# Patient Record
Sex: FEMALE | Race: OTHER | Employment: FULL TIME | URBAN - NONMETROPOLITAN AREA
[De-identification: names, ages, dates, MRNs, and addresses within clinical notes are randomized per-mention and may not be internally consistent; named-entity substitution may affect disease eponyms.]

---
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
## 2021-07-15 ENCOUNTER — HOSPITAL ENCOUNTER (EMERGENCY)
Age: 21
Discharge: HOME | End: 2021-07-15
Attending: EMERGENCY MEDICINE
Payer: COMMERCIAL

## 2021-07-15 ENCOUNTER — APPOINTMENT (OUTPATIENT)
Dept: GENERAL RADIOLOGY | Age: 21
End: 2021-07-15
Payer: COMMERCIAL

## 2021-07-15 VITALS
TEMPERATURE: 97.5 F | WEIGHT: 195 LBS | DIASTOLIC BLOOD PRESSURE: 73 MMHG | BODY MASS INDEX: 31.47 KG/M2 | RESPIRATION RATE: 14 BRPM | OXYGEN SATURATION: 98 % | SYSTOLIC BLOOD PRESSURE: 119 MMHG | HEART RATE: 104 BPM

## 2021-07-15 DIAGNOSIS — S93.402A MODERATE LEFT ANKLE SPRAIN, INITIAL ENCOUNTER: Primary | ICD-10-CM

## 2021-07-15 PROCEDURE — 99214 OFFICE O/P EST MOD 30 MIN: CPT | Performed by: EMERGENCY MEDICINE

## 2021-07-15 PROCEDURE — 73610 X-RAY EXAM OF ANKLE: CPT

## 2021-07-15 PROCEDURE — 99213 OFFICE O/P EST LOW 20 MIN: CPT

## 2021-07-15 RX ORDER — NAPROXEN 500 MG/1
500 TABLET ORAL 2 TIMES DAILY WITH MEALS
Qty: 20 TABLET | Refills: 0 | Status: SHIPPED | OUTPATIENT
Start: 2021-07-15 | End: 2021-08-06

## 2021-07-15 ASSESSMENT — ENCOUNTER SYMPTOMS
DIARRHEA: 0
EYE PAIN: 0
COUGH: 0
FACIAL SWELLING: 0
SORE THROAT: 0
NAUSEA: 0
TROUBLE SWALLOWING: 0
ABDOMINAL PAIN: 0
VOICE CHANGE: 0
EYE REDNESS: 0
STRIDOR: 0
BLOOD IN STOOL: 0
WHEEZING: 0
CONSTIPATION: 0
CHOKING: 0
VOMITING: 0
EYE DISCHARGE: 0
BACK PAIN: 0
SINUS PRESSURE: 0
SHORTNESS OF BREATH: 0

## 2021-07-15 ASSESSMENT — PAIN DESCRIPTION - FREQUENCY: FREQUENCY: INTERMITTENT

## 2021-07-15 ASSESSMENT — PAIN DESCRIPTION - DESCRIPTORS: DESCRIPTORS: DISCOMFORT;PRESSURE;SHARP

## 2021-07-15 ASSESSMENT — PAIN DESCRIPTION - ORIENTATION: ORIENTATION: LEFT

## 2021-07-15 ASSESSMENT — PAIN DESCRIPTION - LOCATION: LOCATION: ANKLE

## 2021-07-15 ASSESSMENT — PAIN - FUNCTIONAL ASSESSMENT: PAIN_FUNCTIONAL_ASSESSMENT: PREVENTS OR INTERFERES WITH MANY ACTIVE NOT PASSIVE ACTIVITIES

## 2021-07-15 ASSESSMENT — PAIN SCALES - GENERAL: PAINLEVEL_OUTOF10: 10

## 2021-07-15 ASSESSMENT — PAIN DESCRIPTION - PAIN TYPE: TYPE: ACUTE PAIN

## 2021-07-15 NOTE — ED NOTES
Air cast applied to pt's left ankle. Medium crutches was given to pt. Pt instructed on how to walk with crutches. Pt demonstrated on how to walk with crutches.       Keisha Cummings LPN  39/57/74 [id number]

## 2021-07-15 NOTE — ED TRIAGE NOTES
Patient limped to rm. 4, states left ankle injury today at home, 0700. Mis-stepped the last stair step, twisting ankle. Denies falling.

## 2021-07-15 NOTE — ED NOTES
Pt discharged. Pt verbalized understanding of discharge instructions and script. Pt walked out with crutches. Pt in stable condition.      Korey Mattson LPN  51/49/12 [id number]

## 2021-07-15 NOTE — ED PROVIDER NOTES
[phone]       Chief Complaint   Patient presents with    Ankle Pain     left ankle injury       Nurses Notes reviewed and I agree except as noted in the HPI. HISTORY OF PRESENT ILLNESS   Ganga Amaya is a 24 y.o. female who presents 1 hour after she sustained twisting injury to her left ankle in her home in [address] [place] [address]. She is unable to bear weight. She had immediate lateral swelling of the left ankle. She rates pain at 10 out of 10 severity. No deformity, laceration, motor or sensory deficit. No head neck or back injury. No previous fracture left lower extremity. Non-smoker    REVIEW OF SYSTEMS     Review of Systems   Constitutional: Negative for appetite change, chills, fatigue, fever and unexpected weight change. HENT: Negative for congestion, ear discharge, ear pain, facial swelling, hearing loss, nosebleeds, postnasal drip, sinus pressure, sore throat, trouble swallowing and voice change. Eyes: Negative for pain, discharge, redness and visual disturbance. Respiratory: Negative for cough, choking, shortness of breath, wheezing and stridor. Cardiovascular: Negative for chest pain and leg swelling. Gastrointestinal: Negative for abdominal pain, blood in stool, constipation, diarrhea, nausea and vomiting. Genitourinary: Negative for dysuria, flank pain, frequency, hematuria, urgency, vaginal bleeding and vaginal discharge. Musculoskeletal: Negative for arthralgias, back pain, neck pain and neck stiffness. Left ankle pain and swelling after twisting injury   Skin: Negative for rash. Neurological: Negative for dizziness, seizures, syncope, weakness, light-headedness and headaches. Hematological: Negative for adenopathy. Does not bruise/bleed easily. Psychiatric/Behavioral: Negative for confusion, sleep disturbance and suicidal ideas. The patient is not nervous/anxious.     All other systems reviewed and are negative. PAST MEDICAL HISTORY         Diagnosis Date    Depression    No previous fracture left lower extremity. No closed head injury. SURGICAL HISTORY     Patient  has a past surgical history that includes cyst removal and [place] tooth extraction. CURRENT MEDICATIONS       Discharge Medication List as of 7/15/2021  9:15 AM          ALLERGIES     Patient is has No Known Allergies. FAMILY HISTORY     Patient'sfamily history includes Cancer in her mother; Depression in her mother; No Known Problems in her father; Substance Abuse in her mother. SOCIAL HISTORY     Patient  reports that she has never smoked. She has never used smokeless tobacco. She reports current alcohol use. She reports current drug use. Drug: Marijuana. PHYSICAL EXAM     ED TRIAGE VITALS  BP: 119/73, Temp: 97.5 °F (36.4 °C), Pulse: 104, Resp: 14, SpO2: 98 %  Physical Exam  Vitals and nursing note reviewed. Constitutional:       General: She is not in acute distress. Appearance: She is well-developed. She is not ill-appearing. Comments: Moist membranes, normal airway   HENT:      Head: Normocephalic and atraumatic. Right Ear: Tympanic membrane and external ear normal.      Left Ear: Tympanic membrane and external ear normal.      Nose: Nose normal.      Mouth/Throat:      Pharynx: No oropharyngeal exudate. Comments: Oropharynx normal  Eyes:      General: No scleral icterus. Right eye: No discharge. Left eye: No discharge. Extraocular Movements:      Right eye: Normal extraocular motion. Left eye: Normal extraocular motion. Conjunctiva/sclera: Conjunctivae normal.      Pupils: Pupils are equal, round, and reactive to light. Comments: Conjunctiva clear, orbits atraumatic   Neck:      Thyroid: No thyromegaly. Vascular: No JVD. Cardiovascular:      Rate and Rhythm: Normal rate and regular rhythm. Pulses: Normal pulses.       Heart sounds: Normal heart sounds, S1 normal and S2 normal. No murmur heard. No friction rub. No gallop. Pulmonary:      Effort: Pulmonary effort is normal. No tachypnea or respiratory distress. Breath sounds: Normal breath sounds. No stridor. No decreased breath sounds, wheezing, rhonchi or rales. Comments: No Cough, chest atraumatic, lungs clear  Chest:      Chest wall: No tenderness. Abdominal:      General: Bowel sounds are normal. There is no distension. Palpations: Abdomen is soft. There is no mass. Tenderness: There is no abdominal tenderness. There is no right CVA tenderness, left CVA tenderness, guarding or rebound. Comments: Soft nontender   Musculoskeletal:         General: Normal range of motion. Cervical back: Normal range of motion. No spinous process tenderness or muscular tenderness. Right ankle: Normal.      Left ankle: Swelling present. Tenderness present over the lateral malleolus, AITF ligament, CF ligament and posterior TF ligament. No base of 5th metatarsal or proximal fibula tenderness. Anterior drawer test negative. Normal pulse. Lymphadenopathy:      Cervical: No cervical adenopathy. Right cervical: No superficial cervical adenopathy. Left cervical: No superficial cervical adenopathy. Skin:     General: Skin is warm and dry. Findings: No erythema or rash. Comments: No laceration or bruising   Neurological:      Mental Status: She is alert and oriented to person, place, and time. Cranial Nerves: No cranial nerve deficit. Motor: No abnormal muscle tone. Coordination: Coordination normal.      Deep Tendon Reflexes: Reflexes are normal and symmetric. Reflexes normal.      Comments: Appropriate, no focal findings, distal neurovascular intact left lower extremity   Psychiatric:         Behavior: Behavior normal.         Thought Content:  Thought content normal.         Judgment: Judgment normal.         DIAGNOSTIC RESULTS   Labs: No results found for this visit on 07/15/21. IMAGING:  XR ANKLE LEFT (MIN 3 VIEWS)   Final Result         1. No acute fracture noted. 2. Soft tissue swelling over the lateral malleolus. .               **This report has been created using voice recognition software. It may contain minor errors which are inherent in voice recognition technology. **      Final report electronically signed by DR Melchor Doyle on 7/15/2021 9:01 AM        URGENT CARE COURSE:     Vitals:    07/15/21 0829   BP: 119/73   Pulse: 104   Resp: 14   Temp: 97.5 °F (36.4 °C)   TempSrc: Temporal   SpO2: 98%   Weight: 195 lb (88.5 kg)       Medications - No data to display  PROCEDURES:  None  FINALIMPRESSION      1. Moderate left ankle sprain, initial encounter        DISPOSITION/PLAN   DISPOSITION Decision To Discharge 07/15/2021 09:12:45 AM  Nontoxic, well-hydrated, normal airway. No radiographic evidence of fracture, dislocation, foreign body, arthritis. No neurovascular complication. No infection. .  Patient has moderate left ankle sprain. Will treat with RICE treatment, crutches, avoidance of weightbearing, Naprosyn, Tylenol. Patient to follow-up with family medicine practice for recheck in 4 days, and understands to go to ED if worse. No weightbearing until recheck. PATIENT REFERRED TO:  [facility] [address],[address]  [facility] [address] [place] [address]  Schedule an appointment as soon as possible for a visit in 4 days  Recheck in office, go to emergency if worse    DISCHARGE MEDICATIONS:  Discharge Medication List as of 7/15/2021  9:15 AM      START taking these medications    Details   naproxen (NAPROSYN) 500 MG tablet Take 1 tablet by mouth 2 times daily (with meals) for 20 doses, Disp-20 tablet, R-0Print           Discharge Medication List as of 7/15/2021  9:15 AM          MD Melina Kumari MD  07/15/21 [address] [place] [address] Road, MD  07/15/21 0437

## 2021-08-06 ENCOUNTER — HOSPITAL ENCOUNTER (EMERGENCY)
Age: 21
Discharge: HOME | End: 2021-08-06
Payer: COMMERCIAL

## 2021-08-06 VITALS
DIASTOLIC BLOOD PRESSURE: 75 MMHG | SYSTOLIC BLOOD PRESSURE: 123 MMHG | BODY MASS INDEX: 31.34 KG/M2 | HEIGHT: 66 IN | RESPIRATION RATE: 16 BRPM | OXYGEN SATURATION: 100 % | HEART RATE: 72 BPM | TEMPERATURE: 98.7 F | WEIGHT: 195 LBS

## 2021-08-06 DIAGNOSIS — Z48.02 VISIT FOR SUTURE REMOVAL: Primary | ICD-10-CM

## 2021-08-06 PROCEDURE — 99211 OFF/OP EST MAY X REQ PHY/QHP: CPT

## 2021-08-06 PROCEDURE — 99024 POSTOP FOLLOW-UP VISIT: CPT | Performed by: NURSE PRACTITIONER

## 2021-08-06 ASSESSMENT — ENCOUNTER SYMPTOMS
COUGH: 0
CHEST TIGHTNESS: 0
COLOR CHANGE: 0
CHOKING: 0
BACK PAIN: 0
APNEA: 0
SHORTNESS OF BREATH: 0
STRIDOR: 0
WHEEZING: 0

## 2021-08-06 NOTE — ED PROVIDER NOTES
[facility]      CHIEF COMPLAINT       Chief Complaint   Patient presents with    Suture / Staple Removal     7 days ago       Nurses Notes reviewed and I agree except as noted in the HPI. HISTORY OFPRESENT ILLNESS   Jon Sauceda is a 24 y.o. The history is provided by the patient. No  was used. Wound Check   She was treated in the ED 5 to 10 days ago. Previous treatment in the ED includes wound cleansing or irrigation. Treatments since wound repair include regular soap and water washings. Fever duration: none. There has been no drainage from the wound. There is no redness present. There is no swelling present. There is no pain present. She has no difficulty moving the affected extremity or digit. REVIEW OF SYSTEMS     Review of Systems   Constitutional: Negative for activity change, appetite change, chills, diaphoresis, fatigue, fever and unexpected weight change. Respiratory: Negative for apnea, cough, choking, chest tightness, shortness of breath, wheezing and stridor. Musculoskeletal: Negative for arthralgias, back pain, gait problem, joint swelling, myalgias, neck pain and neck stiffness. Skin: Positive for wound. Negative for color change, pallor and rash. PAST MEDICAL HISTORY         Diagnosis Date    Depression        SURGICAL HISTORY     Patient  has a past surgical history that includes cyst removal and [place] tooth extraction. CURRENT MEDICATIONS       Discharge Medication List as of 8/6/2021  2:06 PM          ALLERGIES     Patient is has No Known Allergies. FAMILY HISTORY     Patient's family history includes Cancer in her mother; Depression in her mother; No Known Problems in her father; Substance Abuse in her mother. SOCIAL HISTORY     Patient  reports that she has never smoked. She has never used smokeless tobacco. She reports current alcohol use. She reports current drug use.  Drug: Marijuana. PHYSICAL EXAM     ED TRIAGE VITALS  BP: 123/75, Temp: 98.7 °F (37.1 °C), Pulse: 72, Resp: 16, SpO2: 100 %  Physical Exam  Vitals and nursing note reviewed. Constitutional:       General: She is not in acute distress. Appearance: Normal appearance. She is obese. She is not ill-appearing, toxic-appearing or diaphoretic. HENT:      Head: Normocephalic and atraumatic. Right Ear: External ear normal.      Left Ear: External ear normal.   Eyes:      Extraocular Movements: Extraocular movements intact. Conjunctiva/sclera: Conjunctivae normal.   Skin:     Coloration: Skin is not ashen, cyanotic, jaundiced, mottled, pale or sallow. Comments: 5 stitches right temporal region. No redness, oozing, or draining noted. Sutures removed by Akil Nicole. Neurological:      Mental Status: She is alert. DIAGNOSTIC RESULTS   Labs:No results found for this visit on 08/06/21. IMAGING:  No orders to display     URGENT CARE COURSE:     Vitals:    08/06/21 1345   BP: 123/75   Pulse: 72   Resp: 16   Temp: 98.7 °F (37.1 °C)   TempSrc: Temporal   SpO2: 100%   Weight: 195 lb (88.5 kg)   Height: 5' 6\" (1.676 m)       Medications - No data to display  PROCEDURES:  None  FINAL IMPRESSION      1.  Visit for suture removal        DISPOSITION/PLAN     Monitor for redness, drainage, pain   Monitor for any fevers  Keep clean and dry  Take medication as directed  Follow up with your PCP or return for any concerns       PATIENT REFERRED TO:  [facility] [address] [address]  Call today  [phone]    DISCHARGE MEDICATIONS:  Discharge Medication List as of 8/6/2021  2:06 PM        Discharge Medication List as of 8/6/2021  2:06 PM          Brandie Naidu, APRN - JUAN Naidu, NI - CNP  08/06/21 Yu Dash, APRPRIMITIVO - CNP  08/11/21 4034

## 2021-08-06 NOTE — ED NOTES
5 sutures removed from right eye area with no problems. Pt tolerated this procedure well.      Ko Galdamez RN  08/06/21 2413

## 2021-08-06 NOTE — ED TRIAGE NOTES
Pt to [facility] ambulatory with wanting sutures removed from right eye area. No drainage noted from area. Sutures placed 7 days ago at [facility]. RX Auth received for refill for:   ALPRAZolam (XANAX) 0.5 MG tablet 75 tablet 0 1/31/2019     Sig - Route: Take 1 tablet by mouth 3 times daily as needed for Anxiety. - Oral    Sent to pharmacy as: ALPRAZolam 0.5 MG Oral Tablet    Class: Eprescribe      traMADol (ULTRAM) 50 MG tablet 90 tablet 0 1/31/2019     Sig - Route: Take 1 tablet by mouth every 6 hours as needed for Pain. - Oral    Sent to pharmacy as: TraMADol HCl 50 MG Oral Tablet    Class: Eprescribe        ?   Last filled: 1/31/19    Last seen: 9/24/18  Next visit: 4/11/19    PDMP accessed and printed for MD review

## 2022-05-17 ENCOUNTER — APPOINTMENT (OUTPATIENT)
Dept: GENERAL RADIOLOGY | Age: 22
End: 2022-05-17
Payer: COMMERCIAL

## 2022-05-17 ENCOUNTER — HOSPITAL ENCOUNTER (EMERGENCY)
Age: 22
Discharge: HOME | End: 2022-05-17
Payer: COMMERCIAL

## 2022-05-17 VITALS
OXYGEN SATURATION: 99 % | RESPIRATION RATE: 24 BRPM | WEIGHT: 195 LBS | DIASTOLIC BLOOD PRESSURE: 88 MMHG | HEART RATE: 102 BPM | HEIGHT: 66 IN | BODY MASS INDEX: 31.34 KG/M2 | SYSTOLIC BLOOD PRESSURE: 104 MMHG | TEMPERATURE: 99 F

## 2022-05-17 DIAGNOSIS — M54.16 LUMBAR RADICULOPATHY: ICD-10-CM

## 2022-05-17 DIAGNOSIS — M54.50 ACUTE EXACERBATION OF CHRONIC LOW BACK PAIN: Primary | ICD-10-CM

## 2022-05-17 DIAGNOSIS — G89.29 ACUTE EXACERBATION OF CHRONIC LOW BACK PAIN: Primary | ICD-10-CM

## 2022-05-17 LAB
ALBUMIN SERPL-MCNC: 4.6 G/DL (ref 3.5–5.1)
ALP BLD-CCNC: 60 U/L (ref 38–126)
ALT SERPL-CCNC: 13 U/L (ref 11–66)
ANION GAP SERPL CALCULATED.3IONS-SCNC: 12 MEQ/L (ref 8–16)
AST SERPL-CCNC: 15 U/L (ref 5–40)
BASOPHILS # BLD: 0.2 %
BASOPHILS ABSOLUTE: 0 THOU/MM3 (ref 0–0.1)
BILIRUB SERPL-MCNC: 0.4 MG/DL (ref 0.3–1.2)
BILIRUBIN URINE: NEGATIVE
BLOOD, URINE: NEGATIVE
BUN BLDV-MCNC: 7 MG/DL (ref 7–22)
CALCIUM SERPL-MCNC: 9.1 MG/DL (ref 8.5–10.5)
CHARACTER, URINE: CLEAR
CHLORIDE BLD-SCNC: 103 MEQ/L (ref 98–111)
CO2: 21 MEQ/L (ref 23–33)
COLOR: YELLOW
CREAT SERPL-MCNC: 0.7 MG/DL (ref 0.4–1.2)
EOSINOPHIL # BLD: 0.1 %
EOSINOPHILS ABSOLUTE: 0 THOU/MM3 (ref 0–0.4)
ERYTHROCYTE [DISTWIDTH] IN BLOOD BY AUTOMATED COUNT: 12.1 % (ref 11.5–14.5)
ERYTHROCYTE [DISTWIDTH] IN BLOOD BY AUTOMATED COUNT: 42.1 FL (ref 35–45)
GFR SERPL CREATININE-BSD FRML MDRD: > 90 ML/MIN/1.73M2
GLUCOSE BLD-MCNC: 109 MG/DL (ref 70–108)
GLUCOSE URINE: NEGATIVE MG/DL
HCT VFR BLD CALC: 40.8 % (ref 37–47)
HEMOGLOBIN: 13.5 GM/DL (ref 12–16)
IMMATURE GRANS (ABS): 0.03 THOU/MM3 (ref 0–0.07)
IMMATURE GRANULOCYTES: 0.3 %
KETONES, URINE: NEGATIVE
LEUKOCYTE ESTERASE, URINE: NEGATIVE
LIPASE: 10.4 U/L (ref 5.6–51.3)
LYMPHOCYTES # BLD: 3.1 %
LYMPHOCYTES ABSOLUTE: 0.3 THOU/MM3 (ref 1–4.8)
MCH RBC QN AUTO: 31.5 PG (ref 26–33)
MCHC RBC AUTO-ENTMCNC: 33.1 GM/DL (ref 32.2–35.5)
MCV RBC AUTO: 95.1 FL (ref 81–99)
MONOCYTES # BLD: 7.5 %
MONOCYTES ABSOLUTE: 0.8 THOU/MM3 (ref 0.4–1.3)
NITRITE, URINE: NEGATIVE
NUCLEATED RED BLOOD CELLS: 0 /100 WBC
OSMOLALITY CALCULATION: 270.5 MOSMOL/KG (ref 275–300)
PH UA: 7 (ref 5–9)
PLATELET # BLD: 233 THOU/MM3 (ref 130–400)
PMV BLD AUTO: 9.2 FL (ref 9.4–12.4)
POTASSIUM REFLEX MAGNESIUM: 3.7 MEQ/L (ref 3.5–5.2)
PREGNANCY, SERUM: NEGATIVE
PROTEIN UA: NEGATIVE
RBC # BLD: 4.29 MILL/MM3 (ref 4.2–5.4)
SEG NEUTROPHILS: 88.8 %
SEGMENTED NEUTROPHILS ABSOLUTE COUNT: 9.1 THOU/MM3 (ref 1.8–7.7)
SODIUM BLD-SCNC: 136 MEQ/L (ref 135–145)
SPECIFIC GRAVITY, URINE: 1.01 (ref 1–1.03)
TOTAL PROTEIN: 7.3 G/DL (ref 6.1–8)
UROBILINOGEN, URINE: 0.2 EU/DL (ref 0–1)
WBC # BLD: 10.3 THOU/MM3 (ref 4.8–10.8)

## 2022-05-17 PROCEDURE — 96375 TX/PRO/DX INJ NEW DRUG ADDON: CPT

## 2022-05-17 PROCEDURE — 84703 CHORIONIC GONADOTROPIN ASSAY: CPT

## 2022-05-17 PROCEDURE — 6360000002 HC RX W HCPCS: Performed by: PHYSICIAN ASSISTANT

## 2022-05-17 PROCEDURE — 99284 EMERGENCY DEPT VISIT MOD MDM: CPT

## 2022-05-17 PROCEDURE — 83690 ASSAY OF LIPASE: CPT

## 2022-05-17 PROCEDURE — 96374 THER/PROPH/DIAG INJ IV PUSH: CPT

## 2022-05-17 PROCEDURE — 80053 COMPREHEN METABOLIC PANEL: CPT

## 2022-05-17 PROCEDURE — 81003 URINALYSIS AUTO W/O SCOPE: CPT

## 2022-05-17 PROCEDURE — 72100 X-RAY EXAM L-S SPINE 2/3 VWS: CPT

## 2022-05-17 PROCEDURE — 85025 COMPLETE CBC W/AUTO DIFF WBC: CPT

## 2022-05-17 RX ORDER — ONDANSETRON 2 MG/ML
4 INJECTION INTRAMUSCULAR; INTRAVENOUS ONCE
Status: COMPLETED | OUTPATIENT
Start: 2022-05-17 | End: 2022-05-17

## 2022-05-17 RX ORDER — KETOROLAC TROMETHAMINE 30 MG/ML
30 INJECTION, SOLUTION INTRAMUSCULAR; INTRAVENOUS ONCE
Status: COMPLETED | OUTPATIENT
Start: 2022-05-17 | End: 2022-05-17

## 2022-05-17 RX ORDER — KETOROLAC TROMETHAMINE 10 MG/1
10 TABLET, FILM COATED ORAL EVERY 6 HOURS PRN
Qty: 15 TABLET | Refills: 0 | Status: SHIPPED | OUTPATIENT
Start: 2022-05-17

## 2022-05-17 RX ORDER — CYCLOBENZAPRINE HCL 5 MG
5 TABLET ORAL 3 TIMES DAILY PRN
Qty: 12 TABLET | Refills: 0 | Status: SHIPPED | OUTPATIENT
Start: 2022-05-17

## 2022-05-17 RX ORDER — PREDNISONE 50 MG/1
50 TABLET ORAL DAILY
Qty: 5 TABLET | Refills: 0 | Status: SHIPPED | OUTPATIENT
Start: 2022-05-17 | End: 2022-05-22

## 2022-05-17 RX ADMIN — ONDANSETRON 4 MG: 2 INJECTION INTRAMUSCULAR; INTRAVENOUS at 10:27

## 2022-05-17 RX ADMIN — KETOROLAC TROMETHAMINE 30 MG: 30 INJECTION, SOLUTION INTRAMUSCULAR at 10:26

## 2022-05-17 ASSESSMENT — ENCOUNTER SYMPTOMS
SHORTNESS OF BREATH: 0
BACK PAIN: 1
BLOOD IN STOOL: 0
ABDOMINAL PAIN: 0
COLOR CHANGE: 0
CHEST TIGHTNESS: 0
VOMITING: 1

## 2022-05-17 ASSESSMENT — PAIN DESCRIPTION - ORIENTATION: ORIENTATION: LOWER

## 2022-05-17 ASSESSMENT — PAIN SCALES - GENERAL: PAINLEVEL_OUTOF10: 10

## 2022-05-17 ASSESSMENT — PAIN DESCRIPTION - LOCATION: LOCATION: BACK

## 2022-05-17 ASSESSMENT — PAIN - FUNCTIONAL ASSESSMENT: PAIN_FUNCTIONAL_ASSESSMENT: 0-10

## 2022-05-17 NOTE — Clinical Note
Tonya Lerma was seen and treated in our emergency department on 5/17/2022. She may return to work on 05/19/2022. If you have any questions or concerns, please don't hesitate to call.       Roseanna Jones PA-C

## 2022-05-17 NOTE — ED PROVIDER NOTES
[facility] EMERGENCY DEPT      CHIEF COMPLAINT       Chief Complaint   Patient presents with    Back Pain    Emesis       Nurses Notes reviewed and I agree except as noted in the HPI. HISTORY OF PRESENT ILLNESS    Steven Art is a 25 y.o. female who presents for low back pain and emesis. Patient states that she injured her back 1 year ago in a fall and has been experiencing intermittent low back pain ever since. She was evaluated after the injury but never followed up. This morning she says that she woke up in 10/10 sharp low back pain and had 3-4 episodes of emesis. She says that she has never vomited from the back pain before. Denies bloody or coffee ground emesis. She thinks she might've exacerbated the pain by doing something in her sleep but is unsure. States that she had a temperature of 99 or 100 F at home. The back pain radiates into both legs and stops at the knees, feels like numbness and tingling. Tingling is worse when she walks and she says her legs feel weak. No position is comfortable for her at the moment and she is crying due to pain. Does not think she's pregnant. Denies urinary symptoms, loss of bowel or bladder function, no numbness or tingling into the calves or feet. REVIEW OF SYSTEMS     Review of Systems   Constitutional: Positive for fever. Negative for appetite change. Temperature of 99 or 100 F measured at home   Respiratory: Negative for chest tightness and shortness of breath. Cardiovascular: Negative for chest pain, palpitations and leg swelling. Gastrointestinal: Positive for vomiting. Negative for abdominal pain and blood in stool. 3-4 episodes of non-bloody emesis this morning    Genitourinary: Negative for difficulty urinating, dysuria, enuresis, flank pain, frequency, hematuria, pelvic pain and urgency. Musculoskeletal: Positive for arthralgias and back pain. Lumbosacral pain that radiates into the legs. Hazel Mulugeta on her back 1 year ago.    Skin: Negative for color change, rash and wound. Neurological: Positive for weakness and numbness. Negative for dizziness, tremors, seizures, syncope, facial asymmetry and speech difficulty. Numbness, tingling, weakness of bilateral lower extremities that does not extend past the knees        PAST MEDICAL HISTORY    has a past medical history of Depression. SURGICAL HISTORY      has a past surgical history that includes cyst removal and [place] tooth extraction. CURRENT MEDICATIONS       Discharge Medication List as of 5/17/2022 12:54 PM          ALLERGIES     has No Known Allergies. FAMILY HISTORY     She indicated that her mother is alive. She indicated that her father is alive. She indicated that the status of her neg hx is unknown.   family history includes Cancer in her mother; Depression in her mother; No Known Problems in her father; Substance Abuse in her mother. SOCIAL HISTORY    reports that she has never smoked. She has never used smokeless tobacco. She reports current alcohol use. She reports current drug use. Drug: Marijuana Angelique Awkward). PHYSICAL EXAM     INITIAL VITALS:  height is 5' 6\" (1.676 m) and weight is 195 lb (88.5 kg). Her oral temperature is 99 °F (37.2 °C). Her blood pressure is 104/88 and her pulse is 102. Her respiration is 24 and oxygen saturation is 99%. Physical Exam  Constitutional:       General: She is in acute distress. Appearance: Normal appearance. She is diaphoretic. Comments: Patient could not find a comfortable position, was standing and then curled up on the bed. Holding her lumbosacral area and crying    HENT:      Head: Normocephalic and atraumatic. Cardiovascular:      Rate and Rhythm: Regular rhythm. Tachycardia present. Heart sounds: Normal heart sounds. No murmur heard. No friction rub. No gallop. Comments: Tachycardia, 102 bpm  Pulmonary:      Effort: No respiratory distress. Breath sounds: No stridor.  No wheezing, rhonchi or rales. Comments: Tachypnea, 24 bpm  Chest:      Chest wall: No tenderness. Abdominal:      General: Abdomen is flat. There is no distension. Musculoskeletal:         General: Tenderness present. No swelling or deformity. Cervical back: No rigidity. Thoracic back: No deformity, tenderness or bony tenderness. No scoliosis. Lumbar back: Tenderness and bony tenderness present. No swelling, edema, deformity, signs of trauma, lacerations or spasms. Normal range of motion. Positive right straight leg raise test and positive left straight leg raise test. No scoliosis. Back:       Right hip: No crepitus. Normal strength. Left hip: No crepitus. Normal strength. Right lower leg: No tenderness. No edema. Left lower leg: No tenderness. No edema. Right ankle: Normal pulse. Left ankle: Normal pulse. Comments: No erythema, edema, ecchymosis, step-off deformities. Lumbosacral tenderness to palpation. Low back pain elicited with bilateral leg raise. Bilateral lower extremities neurovascularly intact. Skin:     General: Skin is warm. Findings: No bruising, erythema, lesion or rash. Neurological:      General: No focal deficit present. Mental Status: She is alert and oriented to person, place, and time. Motor: No tremor, atrophy or abnormal muscle tone.    Psychiatric:         Mood and Affect: Mood normal.         Behavior: Behavior normal.         DIFFERENTIAL DIAGNOSIS:   Including but not limited to: spondylosis, spondylolisthesis, pinched nerve, fractured vertebrae, kidney stone, pyelonephritis     DIAGNOSTIC RESULTS     EKG: All E[facility]'s are [facility] who either signs or Co-signs this chart in the absence of a cardiologist.  none    RADIOLOGY: non-plain film images(s) such as CT,Ultrasound and MRI are read by the radiologist.  Plain radiographic images are visualized and preliminarily interpreted by the emergency physician unless otherwise stated below. XR LUMBAR SPINE (2-3 VIEWS)   Final Result      No fracture or spondylolisthesis of the lumbar spine. Final report electronically signed by Dr. Kristen Helms on 5/17/2022 10:44 AM          LABS:   Labs Reviewed   CBC WITH AUTO DIFFERENTIAL - Abnormal; Notable for the following components:       Result Value    MPV 9.2 (*)     Segs Absolute 9.1 (*)     Lymphocytes Absolute 0.3 (*)     All other components within normal limits   COMPREHENSIVE METABOLIC PANEL W/ REFLEX TO MG FOR LOW K - Abnormal; Notable for the following components:    Glucose 109 (*)     CO2 21 (*)     All other components within normal limits   OSMOLALITY - Abnormal; Notable for the following components:    Osmolality Calc 270.5 (*)     All other components within normal limits   HCG, SERUM, QUALITATIVE   URINALYSIS WITH REFLEX TO CULTURE   LIPASE   ANION GAP   GLOMERULAR FILTRATION RATE, ESTIMATED       EMERGENCY DEPARTMENT COURSE:   Vitals:    Vitals:    05/17/22 1008   BP: 104/88   Pulse: 102   Resp: 24   Temp: 99 °F (37.2 °C)   TempSrc: Oral   SpO2: 99%   Weight: 195 lb (88.5 kg)   Height: 5' 6\" (1.676 m)       Patient was seen in the emergency department during the global pandemic, when there was surge capacity and regional healthcare crisis. MDM:  The patient was seen and evaluated within the ED today for low back pain. On exam, I appreciated a 55-year-old female who was holding her low back standing in the room tearful. The patient was neurovascularly intact. Old records were reviewed. Within the department, I observed the patient's vital signs to be within acceptable range. Radiological studies within the department revealed no acute intraosseous process. Laboratory work was reassuring including no evidence for hematuria. Within the department the patient was treated with Toradol and Zofran. I observed the patient's condition to modestly improve during the duration of their stay.  On re-exam patient's pain was resolved and she was laying calmly on the bed. She remained neurovascularly intact and demonstrated a steady independent gait. Vital signs remained stable. The patient remained non-toxic appearing with no distress evident in the ER. The patient was comfortable with the plan of discharge home and to follow up with [facility]. Anticipatory guidance was given. The patient was instructed to return to the emergency department for any worsening of their symptoms. Patient was discharged from the emergency department in good condition with all questions answered. See disposition below. I have given the patient strict written and verbal instructions about care at home, follow-up, and signs and symptoms of worsening of condition and they did verbalize understanding. Medications   ketorolac (TORADOL) injection 30 mg (30 mg IntraVENous Given 5/17/22 1026)   ondansetron (ZOFRAN) injection 4 mg (4 mg IntraVENous Given 5/17/22 1027)     CRITICAL CARE:   None    CONSULTS:  None    PROCEDURES:  None    FINAL IMPRESSION      1. Acute exacerbation of chronic low back pain    2. Lumbar radiculopathy          DISPOSITION/PLAN     1. Acute exacerbation of chronic low back pain    2.  Lumbar radiculopathy        PATIENT REFERRED TO:  [address]  Schedule an appointment as soon as possible for a visit in 3 days        DISCHARGE MEDICATIONS:  Discharge Medication List as of 5/17/2022 12:54 PM      START taking these medications    Details   predniSONE (DELTASONE) 50 MG tablet Take 1 tablet by mouth daily for 5 days, Disp-5 tablet, R-0Normal      cyclobenzaprine (FLEXERIL) 5 MG tablet Take 1 tablet by mouth 3 times daily as needed for Muscle spasms, Disp-12 tablet, R-0Normal      ketorolac (TORADOL) 10 MG tablet Take 1 tablet by mouth every 6 hours as needed for Pain, Disp-15 tablet, R-0Normal             (Please note that portions of this note were completed with a voice recognition program.  Efforts were made to edit the dictations but occasionally words are mis-transcribed.)    Gaudencio Villagomez PA-C 05/20/22 2:36 PM    REMEDIOS Earl PA-C  05/20/22 [id number]

## 2022-05-17 NOTE — ED TRIAGE NOTES
Presents to ER with complaints of sudden low back pain that began this morning. Pt also complaints of nausea and emesis. Upon arrival pt tearful holding lower back.

## 2022-05-31 ENCOUNTER — HOSPITAL ENCOUNTER (EMERGENCY)
Age: 22
Discharge: HOME | End: 2022-05-31
Attending: EMERGENCY MEDICINE
Payer: COMMERCIAL

## 2022-05-31 VITALS
DIASTOLIC BLOOD PRESSURE: 63 MMHG | OXYGEN SATURATION: 96 % | HEART RATE: 59 BPM | SYSTOLIC BLOOD PRESSURE: 96 MMHG | RESPIRATION RATE: 18 BRPM | TEMPERATURE: 98.9 F

## 2022-05-31 DIAGNOSIS — G43.909 MIGRAINE WITHOUT STATUS MIGRAINOSUS, NOT INTRACTABLE, UNSPECIFIED MIGRAINE TYPE: Primary | ICD-10-CM

## 2022-05-31 LAB
ALBUMIN SERPL-MCNC: 4.4 G/DL (ref 3.5–5.1)
ALP BLD-CCNC: 63 U/L (ref 38–126)
ALT SERPL-CCNC: 16 U/L (ref 11–66)
ANION GAP SERPL CALCULATED.3IONS-SCNC: 10 MEQ/L (ref 8–16)
AST SERPL-CCNC: 13 U/L (ref 5–40)
BASOPHILS # BLD: 0.3 %
BASOPHILS ABSOLUTE: 0 THOU/MM3 (ref 0–0.1)
BILIRUB SERPL-MCNC: 0.6 MG/DL (ref 0.3–1.2)
BUN BLDV-MCNC: 10 MG/DL (ref 7–22)
CALCIUM SERPL-MCNC: 9.3 MG/DL (ref 8.5–10.5)
CHLORIDE BLD-SCNC: 105 MEQ/L (ref 98–111)
CO2: 22 MEQ/L (ref 23–33)
CREAT SERPL-MCNC: 0.9 MG/DL (ref 0.4–1.2)
EOSINOPHIL # BLD: 1.1 %
EOSINOPHILS ABSOLUTE: 0.1 THOU/MM3 (ref 0–0.4)
ERYTHROCYTE [DISTWIDTH] IN BLOOD BY AUTOMATED COUNT: 12.3 % (ref 11.5–14.5)
ERYTHROCYTE [DISTWIDTH] IN BLOOD BY AUTOMATED COUNT: 42.7 FL (ref 35–45)
GFR SERPL CREATININE-BSD FRML MDRD: 78 ML/MIN/1.73M2
GLUCOSE BLD-MCNC: 124 MG/DL (ref 70–108)
HCT VFR BLD CALC: 43.5 % (ref 37–47)
HEMOGLOBIN: 14.1 GM/DL (ref 12–16)
IMMATURE GRANS (ABS): 0.02 THOU/MM3 (ref 0–0.07)
IMMATURE GRANULOCYTES: 0.3 %
LYMPHOCYTES # BLD: 32.2 %
LYMPHOCYTES ABSOLUTE: 2.4 THOU/MM3 (ref 1–4.8)
MCH RBC QN AUTO: 30.9 PG (ref 26–33)
MCHC RBC AUTO-ENTMCNC: 32.4 GM/DL (ref 32.2–35.5)
MCV RBC AUTO: 95.2 FL (ref 81–99)
MONOCYTES # BLD: 6.5 %
MONOCYTES ABSOLUTE: 0.5 THOU/MM3 (ref 0.4–1.3)
NUCLEATED RED BLOOD CELLS: 0 /100 WBC
OSMOLALITY CALCULATION: 274.3 MOSMOL/KG (ref 275–300)
PLATELET # BLD: 313 THOU/MM3 (ref 130–400)
PMV BLD AUTO: 10.2 FL (ref 9.4–12.4)
POTASSIUM REFLEX MAGNESIUM: 3.9 MEQ/L (ref 3.5–5.2)
PREGNANCY, SERUM: NEGATIVE
RBC # BLD: 4.57 MILL/MM3 (ref 4.2–5.4)
SEG NEUTROPHILS: 59.6 %
SEGMENTED NEUTROPHILS ABSOLUTE COUNT: 4.4 THOU/MM3 (ref 1.8–7.7)
SODIUM BLD-SCNC: 137 MEQ/L (ref 135–145)
TOTAL PROTEIN: 7.4 G/DL (ref 6.1–8)
WBC # BLD: 7.4 THOU/MM3 (ref 4.8–10.8)

## 2022-05-31 PROCEDURE — 99284 EMERGENCY DEPT VISIT MOD MDM: CPT

## 2022-05-31 PROCEDURE — 96374 THER/PROPH/DIAG INJ IV PUSH: CPT

## 2022-05-31 PROCEDURE — 6370000000 HC RX 637 (ALT 250 FOR IP): Performed by: STUDENT IN AN ORGANIZED HEALTH CARE EDUCATION/TRAINING PROGRAM

## 2022-05-31 PROCEDURE — 36415 COLL VENOUS BLD VENIPUNCTURE: CPT

## 2022-05-31 PROCEDURE — 6360000002 HC RX W HCPCS: Performed by: STUDENT IN AN ORGANIZED HEALTH CARE EDUCATION/TRAINING PROGRAM

## 2022-05-31 PROCEDURE — 80053 COMPREHEN METABOLIC PANEL: CPT

## 2022-05-31 PROCEDURE — 2580000003 HC RX 258: Performed by: STUDENT IN AN ORGANIZED HEALTH CARE EDUCATION/TRAINING PROGRAM

## 2022-05-31 PROCEDURE — 84703 CHORIONIC GONADOTROPIN ASSAY: CPT

## 2022-05-31 PROCEDURE — 96375 TX/PRO/DX INJ NEW DRUG ADDON: CPT

## 2022-05-31 PROCEDURE — 85025 COMPLETE CBC W/AUTO DIFF WBC: CPT

## 2022-05-31 RX ORDER — KETOROLAC TROMETHAMINE 30 MG/ML
15 INJECTION, SOLUTION INTRAMUSCULAR; INTRAVENOUS ONCE
Status: COMPLETED | OUTPATIENT
Start: 2022-05-31 | End: 2022-05-31

## 2022-05-31 RX ORDER — DIPHENHYDRAMINE HYDROCHLORIDE 50 MG/ML
50 INJECTION INTRAMUSCULAR; INTRAVENOUS ONCE
Status: COMPLETED | OUTPATIENT
Start: 2022-05-31 | End: 2022-05-31

## 2022-05-31 RX ORDER — 0.9 % SODIUM CHLORIDE 0.9 %
1000 INTRAVENOUS SOLUTION INTRAVENOUS ONCE
Status: COMPLETED | OUTPATIENT
Start: 2022-05-31 | End: 2022-05-31

## 2022-05-31 RX ORDER — DEXAMETHASONE SODIUM PHOSPHATE 4 MG/ML
6 INJECTION, SOLUTION INTRA-ARTICULAR; INTRALESIONAL; INTRAMUSCULAR; INTRAVENOUS; SOFT TISSUE ONCE
Status: COMPLETED | OUTPATIENT
Start: 2022-05-31 | End: 2022-05-31

## 2022-05-31 RX ORDER — ACETAMINOPHEN 500 MG
1000 TABLET ORAL ONCE
Status: COMPLETED | OUTPATIENT
Start: 2022-05-31 | End: 2022-05-31

## 2022-05-31 RX ORDER — PROCHLORPERAZINE EDISYLATE 5 MG/ML
10 INJECTION INTRAMUSCULAR; INTRAVENOUS ONCE
Status: COMPLETED | OUTPATIENT
Start: 2022-05-31 | End: 2022-05-31

## 2022-05-31 RX ADMIN — SODIUM CHLORIDE 1000 ML: 9 INJECTION, SOLUTION INTRAVENOUS at 19:18

## 2022-05-31 RX ADMIN — DIPHENHYDRAMINE HYDROCHLORIDE 50 MG: 50 INJECTION, SOLUTION INTRAMUSCULAR; INTRAVENOUS at 19:19

## 2022-05-31 RX ADMIN — KETOROLAC TROMETHAMINE 15 MG: 30 INJECTION, SOLUTION INTRAMUSCULAR; INTRAVENOUS at 20:36

## 2022-05-31 RX ADMIN — DEXAMETHASONE SODIUM PHOSPHATE 6 MG: 4 INJECTION, SOLUTION INTRA-ARTICULAR; INTRALESIONAL; INTRAMUSCULAR; INTRAVENOUS; SOFT TISSUE at 19:18

## 2022-05-31 RX ADMIN — PROCHLORPERAZINE EDISYLATE 10 MG: 5 INJECTION INTRAMUSCULAR; INTRAVENOUS at 19:20

## 2022-05-31 RX ADMIN — ACETAMINOPHEN 1000 MG: 500 TABLET ORAL at 19:21

## 2022-05-31 ASSESSMENT — ENCOUNTER SYMPTOMS
PHOTOPHOBIA: 1
VOMITING: 0
BACK PAIN: 0
ABDOMINAL PAIN: 0
RHINORRHEA: 0
SINUS PAIN: 0
NAUSEA: 0
EYE REDNESS: 0
SHORTNESS OF BREATH: 0
COUGH: 0
DIARRHEA: 0
SORE THROAT: 0

## 2022-05-31 ASSESSMENT — PAIN SCALES - GENERAL
PAINLEVEL_OUTOF10: 8
PAINLEVEL_OUTOF10: 8

## 2022-05-31 ASSESSMENT — PAIN DESCRIPTION - LOCATION: LOCATION: HEAD

## 2022-05-31 ASSESSMENT — PAIN DESCRIPTION - DESCRIPTORS: DESCRIPTORS: ACHING

## 2022-05-31 NOTE — ED PROVIDER NOTES
[address] [address]          Pt Name: Eliud Bass  MRN: [id number]  Birthdate 2000  Date of evaluation: 5/31/2022  Treating Resident Physician: Efraín Nagy MD  Supervising Physician: Dr Emmanuel Giraldo       Chief Complaint   Patient presents with    Headache     History obtained from the patient. HISTORY OF PRESENT ILLNESS    HPI  Eliud Bass is a 25 y.o. female with past medical history of depression and cannabis use who presents to the emergency department for evaluation of migraine that began last night. She has a history of headache/migraine she states this 1 feels similar to prior migraines that she had in the past as slow in onset and has been waxing waning initially had some improvement after taking Tylenol Motrin however its been recurring throughout the day and worsening. She denies any recent traumatic injuries or falls. Is any familial history of brain aneurysms. Denies any seizures, numbness, tingling, jaw claudication, fever, chills, neck pain, neck stiffness, rashes, recent travel, vision changes. The patient has no other acute complaints at this time. REVIEW OF SYSTEMS   Review of Systems   Constitutional: Negative for chills and fever. HENT: Negative for rhinorrhea, sinus pain and sore throat. Eyes: Positive for photophobia. Negative for redness and visual disturbance. Respiratory: Negative for cough and shortness of breath. Cardiovascular: Negative for chest pain. Gastrointestinal: Negative for abdominal pain, diarrhea, nausea and vomiting. Genitourinary: Negative for dysuria. Musculoskeletal: Negative for back pain, neck pain and neck stiffness. Skin: Negative for rash. Neurological: Positive for headaches. Negative for weakness, light-headedness and numbness. Psychiatric/Behavioral: Negative for agitation.          PAST MEDICAL AND SURGICAL HISTORY     Past Medical History:   Diagnosis Date    Depression      Past Surgical History:   Procedure Laterality Date    CYST REMOVAL      WISDOM TOOTH EXTRACTION           MEDICATIONS   No current facility-administered medications for this encounter. Current Outpatient Medications:     cyclobenzaprine (FLEXERIL) 5 MG tablet, Take 1 tablet by mouth 3 times daily as needed for Muscle spasms, Disp: 12 tablet, Rfl: 0    ketorolac (TORADOL) 10 MG tablet, Take 1 tablet by mouth every 6 hours as needed for Pain, Disp: 15 tablet, Rfl: 0      SOCIAL HISTORY     Social History     Social History Narrative    Not on file     Social History     Tobacco Use    Smoking status: Never Smoker    Smokeless tobacco: Never Used   Vaping Use    Vaping Use: Never used   Substance Use Topics    Alcohol use: Yes     Comment: social    Drug use: Yes     Types: Marijuana Shiva NovemberMary     Comment: 7-         ALLERGIES   No Known Allergies      FAMILY HISTORY     Family History   Problem Relation Age of Onset    Cancer Mother     Depression Mother     Substance Abuse Mother     No Known Problems Father     Arthritis Neg Hx     Diabetes Neg Hx     High Blood Pressure Neg Hx          PREVIOUS RECORDS   Previous records reviewed: Patient was seen here on 5/17/2020 for acute exacerbation of chronic low back pain. PHYSICAL EXAM     ED Triage Vitals   BP Temp Temp src Pulse Resp SpO2 Height Weight   -- -- -- -- -- -- -- --     Initial vital signs and nursing assessment reviewed and normal. Pulsoximetry is normal per my interpretation. Additional Vital Signs:  Vitals:    05/31/22 1925   BP: 94/67   Pulse: 95   Resp: 17   Temp:    SpO2: 97%       Physical Exam  Vitals and nursing note reviewed. Constitutional:       General: She is in acute distress. Appearance: She is not toxic-appearing. HENT:      Head: Normocephalic and atraumatic.       Right Ear: External ear normal.      Left Ear: External ear normal.      Nose: Nose normal. Mouth/Throat:      Mouth: Mucous membranes are moist.      Pharynx: Oropharynx is clear. Eyes:      General: No visual field deficit or scleral icterus. Extraocular Movements: Extraocular movements intact. Conjunctiva/sclera: Conjunctivae normal.      Pupils: Pupils are equal, round, and reactive to light. Cardiovascular:      Rate and Rhythm: Normal rate and regular rhythm. Pulses: Normal pulses. Heart sounds: Normal heart sounds. Pulmonary:      Effort: Pulmonary effort is normal. No respiratory distress. Breath sounds: Normal breath sounds. Abdominal:      General: Abdomen is flat. There is no distension. Palpations: Abdomen is soft. Tenderness: There is no abdominal tenderness. There is no guarding or rebound. Musculoskeletal:         General: Normal range of motion. Cervical back: Normal range of motion and neck supple. No rigidity. No muscular tenderness. Lymphadenopathy:      Cervical: No cervical adenopathy. Skin:     General: Skin is warm and dry. Capillary Refill: Capillary refill takes less than 2 seconds. Coloration: Skin is not jaundiced. Neurological:      General: No focal deficit present. Mental Status: She is alert and oriented to person, place, and time. GCS: GCS eye subscore is 4. GCS verbal subscore is 5. GCS motor subscore is 6. Cranial Nerves: Cranial nerves are intact. No cranial nerve deficit, dysarthria or facial asymmetry. Sensory: Sensation is intact. No sensory deficit. Motor: Motor function is intact. No weakness, tremor, atrophy, abnormal muscle tone, seizure activity or pronator drift. Coordination: Coordination is intact.  Coordination normal. Finger-Nose-Finger Test and Heel to [facility] Test normal.   Psychiatric:         Mood and Affect: Mood normal.         Behavior: Behavior normal.           MEDICAL DECISION MAKING      Has had significant improvement in her overall migraine with Compazine, Benadryl, Decadron for her rebounding headaches have been current of last week, Tylenol and fluids. She no focal neurological deficit upon examination, no jaw claudication or neck pain no neck stiffness. No fevers no chills. ED RESULTS   Laboratory results:  Labs Reviewed   COMPREHENSIVE METABOLIC PANEL W/ REFLEX TO MG FOR LOW K - Abnormal; Notable for the following components:       Result Value    Glucose 124 (*)     CO2 22 (*)     All other components within normal limits   OSMOLALITY - Abnormal; Notable for the following components:    Osmolality Calc 274.3 (*)     All other components within normal limits   GLOMERULAR FILTRATION RATE, ESTIMATED - Abnormal; Notable for the following components:    Est, Glom Filt Rate 78 (*)     All other components within normal limits   CBC WITH AUTO DIFFERENTIAL   HCG, SERUM, QUALITATIVE   ANION GAP       Radiologic studies results:  No orders to display       ED Medications administered this visit:   Medications   prochlorperazine (COMPAZINE) injection 10 mg (10 mg IntraVENous Given 5/31/22 1920)   diphenhydrAMINE (BENADRYL) injection 50 mg (50 mg IntraVENous Given 5/31/22 1919)   acetaminophen (TYLENOL) tablet 1,000 mg (1,000 mg Oral Given 5/31/22 1921)   0.9 % sodium chloride bolus (1,000 mLs IntraVENous New Bag 5/31/22 1918)   dexamethasone (DECADRON) injection 6 mg (6 mg IntraVENous Given 5/31/22 1918)         ED COURSE     ED Course as of 05/31/22 2026   Tue May 31, 2022   1920 WBC: 7.4 [AL]   1921 Hemoglobin Quant: 14.1 [AL]   1921 Hematocrit: 43.5 [AL]   1921 Platelet Count: 672 [AL]   1923 Preg, Serum: NEGATIVE [AL]      ED Course User Index  [AL] Lisa Alvarez MD     Strict return precautions and follow up instructions were discussed with the patient prior to discharge, with which the patient agrees.       MEDICATION CHANGES     New Prescriptions    No medications on file         FINAL DISPOSITION     Final diagnoses:   Migraine without status migrainosus, not intractable, unspecified migraine type     Condition: condition: good  Dispo: Discharge to home      This transcription was electronically signed. Parts of this transcriptions may have been dictated by use of voice recognition software and electronically transcribed, and parts may have been transcribed with the assistance of an ED scribe. The transcription may contain errors not detected in proofreading. Please refer to my supervising physician's documentation if my documentation differs.     Electronically Signed: Sia Pereira MD, 05/31/22, 8:26 PM         Sia Pereira MD  Resident  05/31/22 0851

## 2022-05-31 NOTE — ED NOTES
RN medicated pt per [facility]. Pt is resting in cot with respirations even and unlabored. Pt voices no concern or need at this time. Call light is within reach. Will continue to monitor.       Jeff Dawson RN  05/31/22 1182

## 2022-05-31 NOTE — ED NOTES
ED nurse-to-nurse bedside report    Chief Complaint   Patient presents with    Headache      LOC: alert and orientated to name, place, date  Vital signs   Vitals:    05/31/22 1847   BP: 112/71   Pulse: 90   Resp: 16   Temp: 98.9 °F (37.2 °C)   TempSrc: Oral   SpO2: 99%      Pain:    Pain Interventions: n/a  Pain Goal: n/a  Oxygen: No    Current needs required na   Telemetry: No  LDAs:   Peripheral IV 05/31/22 Right Antecubital (Active)   Site Assessment Clean, dry & intact 05/31/22 1858     Continuous Infusions:   Mobility: Independent  Redman Fall Risk Score: No flowsheet data found.   Fall Interventions: side rails, call light  Report given to: Luma Abraham RN  05/31/22 2823

## 2022-05-31 NOTE — ED TRIAGE NOTES
Patient presents to the ED for headache that began yesterday. She states she gets migraines a couple times each week and normally takes ibuprofen and tylenol with relief. Patient denies any relief with normal medications today. She denies nausea or vomiting but reports photophobia. Patient denies any other symptoms today.

## 2022-05-31 NOTE — LETTER
[facility] [address] [address] EMERGENCY DEPT  [address] [place] [address]  Phone: [phone]               May 31, 2022    Patient: Cooper Hughes   YOB: 2000   Date of Visit: 5/31/2022       To Whom It May Concern:    Randee Sanchez was seen and treated in our emergency department on 5/31/2022. She may return to work on 6/1.       Sincerely,       Marquis Reeves RN         Signature:__________________________________

## 2022-10-07 ENCOUNTER — HOSPITAL ENCOUNTER (EMERGENCY)
Age: 22
Discharge: HOME | End: 2022-10-07
Attending: EMERGENCY MEDICINE
Payer: COMMERCIAL

## 2022-10-07 VITALS
DIASTOLIC BLOOD PRESSURE: 86 MMHG | RESPIRATION RATE: 20 BRPM | SYSTOLIC BLOOD PRESSURE: 126 MMHG | OXYGEN SATURATION: 99 % | TEMPERATURE: 98.2 F | HEART RATE: 85 BPM

## 2022-10-07 DIAGNOSIS — J06.9 ACUTE UPPER RESPIRATORY INFECTION: Primary | ICD-10-CM

## 2022-10-07 LAB
FLU A ANTIGEN: NEGATIVE
FLU B ANTIGEN: NEGATIVE
GROUP A STREP CULTURE, REFLEX: NEGATIVE
REFLEX THROAT C + S: NORMAL
SARS-COV-2, NAAT: NOT  DETECTED

## 2022-10-07 PROCEDURE — 87635 SARS-COV-2 COVID-19 AMP PRB: CPT

## 2022-10-07 PROCEDURE — 87880 STREP A ASSAY W/OPTIC: CPT

## 2022-10-07 PROCEDURE — 99283 EMERGENCY DEPT VISIT LOW MDM: CPT

## 2022-10-07 PROCEDURE — 87070 CULTURE OTHR SPECIMN AEROBIC: CPT

## 2022-10-07 PROCEDURE — 87804 INFLUENZA ASSAY W/OPTIC: CPT

## 2022-10-07 ASSESSMENT — PAIN - FUNCTIONAL ASSESSMENT: PAIN_FUNCTIONAL_ASSESSMENT: NONE - DENIES PAIN

## 2022-10-07 NOTE — ED PROVIDER NOTES
[address] ENCOUNTER      PATIENT NAME: Joana Fortune  MRN: [id number]  : 2000  GENTILE: 10/7/2022  PROVIDER: Ana Bueno MD      CHIEF COMPLAINT       Chief Complaint   Patient presents with    Covid Testing       Patient is seen and evaluated in a timely fashion. Nurses Notes are reviewed and I agree except as noted in the HPI. HISTORY OF PRESENT ILLNESS    Joana Fortune is a 25 y.o. female who presents to Emergency Department with Covid Testing     Patient presents ED for a COVID-19 test.  This is requested by her employer. Patient has been having URI symptoms since yesterday and her symptoms include sore throat and nasal congestion. Patient has no fever or chills. No chest pain. Mild nonproductive coughing. No body aches. No nausea or vomiting. No diarrhea. She is healthy otherwise. No major past medical history other than depression. This HPI was provided by patient. REVIEW OF SYSTEMS   Ten-point review of systems is negative except those documented in above HPI including constitutional, HEENT, respiratory, cardiovascular, gastrointestinal, genitourinary, musculoskeletal, skin, neurological, hematological and behavioral.      PAST MEDICAL HISTORY    has a past medical history of Depression. SURGICAL HISTORY      has a past surgical history that includes cyst removal and [place] tooth extraction. CURRENT MEDICATIONS       Previous Medications    CYCLOBENZAPRINE (FLEXERIL) 5 MG TABLET    Take 1 tablet by mouth 3 times daily as needed for Muscle spasms    KETOROLAC (TORADOL) 10 MG TABLET    Take 1 tablet by mouth every 6 hours as needed for Pain       ALLERGIES     has No Known Allergies. FAMILY HISTORY     She indicated that her mother is alive. She indicated that her father is alive.  She indicated that the status of her neg hx is unknown.   family history includes Cancer in her mother; Depression in her mother; No Known Problems in her father; Substance Abuse in her mother. SOCIAL HISTORY      reports that she has never smoked. She has never used smokeless tobacco. She reports current alcohol use. She reports current drug use. Drug: Marijuana Garrel Calender). PHYSICAL EXAM      oral temperature is 98.2 °F (36.8 °C). Her blood pressure is 126/86 and her pulse is 85. Her respiration is 20 and oxygen saturation is 99%. Physical Exam  Vitals and nursing note reviewed. Constitutional:       Appearance: She is well-developed. She is not diaphoretic. HENT:      Head: Normocephalic and atraumatic. Nose: Nose normal.      Mouth/Throat:      Pharynx: Posterior oropharyngeal erythema present. No oropharyngeal exudate. Eyes:      General: No scleral icterus. Right eye: No discharge. Left eye: No discharge. Conjunctiva/sclera: Conjunctivae normal.      Pupils: Pupils are equal, round, and reactive to light. Neck:      Vascular: No JVD. Trachea: No tracheal deviation. Cardiovascular:      Rate and Rhythm: Normal rate and regular rhythm. Heart sounds: Normal heart sounds. No murmur heard. No friction rub. No gallop. Pulmonary:      Effort: Pulmonary effort is normal. No respiratory distress. Breath sounds: Normal breath sounds. No stridor. No wheezing or rales. Chest:      Chest wall: No tenderness. Abdominal:      General: Bowel sounds are normal. There is no distension. Palpations: Abdomen is soft. There is no mass. Tenderness: There is no abdominal tenderness. There is no guarding or rebound. Hernia: No hernia is present. Musculoskeletal:         General: No tenderness or deformity. Cervical back: Normal range of motion and neck supple. Lymphadenopathy:      Cervical: No cervical adenopathy. Skin:     General: Skin is warm and dry. Capillary Refill: Capillary refill takes less than 2 seconds. Coloration: Skin is not pale. Findings: No erythema or rash. Neurological:      Mental Status: She is alert and oriented to person, place, and time. Cranial Nerves: No cranial nerve deficit. Sensory: No sensory deficit. Motor: No abnormal muscle tone. Coordination: Coordination normal.      Deep Tendon Reflexes: Reflexes normal.   Psychiatric:         Behavior: Behavior normal.         Thought Content: Thought content normal.         Judgment: Judgment normal.     ANCILLARY TEST RESULTS   EKG: Interpreted by me  None    LAB RESULTS:  Results for orders placed or performed during the hospital encounter of 10/07/22   Rapid influenza A/B antigens    Specimen: Nasopharyngeal   Result Value Ref Range    Flu A Antigen Negative NEGATIVE    Flu B Antigen Negative NEGATIVE   COVID-19, Rapid    Specimen: Nasopharyngeal Swab   Result Value Ref Range    SARS-CoV-2, NAAT NOT  DETECTED NOT DETECTED   Group A Strep, Reflex   Result Value Ref Range    GROUP A STREP CULTURE, REFLEX Negative NEGATIVE       RADIOLOGY REPORTS  No orders to display       [facility] ([facility]) AND ED COURSE   Patient is seen and evaluated at 6:30 PM EDT. DDx: URI, ruling out COVID-19    Afebrile vital signs stable. Physical exam shows mild erythema from oropharynx, no exudates, no findings suggesting strep throat. COVID-19 test is negative. Patient is reassured and discharged with PCP follow-up as needed. She can go back to work. Consult  None    Procedures  None    Medications - No data to display    Vitals:    10/07/22 1802   BP: 126/86   Pulse: 85   Resp: 20   Temp: 98.2 °F (36.8 °C)   TempSrc: Oral   SpO2: 99%       FINAL IMPRESSION AND DISPOSITION      1.  Acute upper respiratory infection        DISPOSITION Decision To Discharge 10/07/2022 06:55:30 PM      PATIENT REFERRED TO:  PCP      As needed  DISCHARGE MEDICATIONS:  New Prescriptions    No medications on file     (Please note that portions of this note were completed with a voice recognition program.  Efforts were made to edit the dictations but occasionally words aremis-transcribed.)  MD Alley Seals MD  10/07/22 3708

## 2022-10-07 NOTE — ED TRIAGE NOTES
Pt presents to the ED requesting COVID19 test. 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 Pt states symptoms stated yesterday. Pt states she has a runny nose and sore throat.

## 2022-10-07 NOTE — Clinical Note
Griselda Combs was seen and treated in our emergency department on 10/7/2022. She may return to work on 10/08/2022. COVID-19 test is NEGATIVE. If you have any questions or concerns, please don't hesitate to call.       Demond Rutledge MD

## 2022-10-09 LAB — THROAT/NOSE CULTURE: NORMAL

## 2022-10-21 ENCOUNTER — HOSPITAL ENCOUNTER (EMERGENCY)
Age: 22
Discharge: HOME | End: 2022-10-21
Payer: COMMERCIAL

## 2022-10-21 VITALS
WEIGHT: 190 LBS | SYSTOLIC BLOOD PRESSURE: 109 MMHG | RESPIRATION RATE: 16 BRPM | BODY MASS INDEX: 30.53 KG/M2 | HEART RATE: 94 BPM | HEIGHT: 66 IN | DIASTOLIC BLOOD PRESSURE: 61 MMHG | OXYGEN SATURATION: 97 % | TEMPERATURE: 98.7 F

## 2022-10-21 DIAGNOSIS — R51.9 ACUTE NONINTRACTABLE HEADACHE, UNSPECIFIED HEADACHE TYPE: Primary | ICD-10-CM

## 2022-10-21 PROCEDURE — 96372 THER/PROPH/DIAG INJ SC/IM: CPT

## 2022-10-21 PROCEDURE — 6360000002 HC RX W HCPCS: Performed by: NURSE PRACTITIONER

## 2022-10-21 PROCEDURE — 99213 OFFICE O/P EST LOW 20 MIN: CPT

## 2022-10-21 PROCEDURE — 99213 OFFICE O/P EST LOW 20 MIN: CPT | Performed by: NURSE PRACTITIONER

## 2022-10-21 RX ORDER — KETOROLAC TROMETHAMINE 30 MG/ML
30 INJECTION, SOLUTION INTRAMUSCULAR; INTRAVENOUS ONCE
Status: COMPLETED | OUTPATIENT
Start: 2022-10-21 | End: 2022-10-21

## 2022-10-21 RX ADMIN — KETOROLAC TROMETHAMINE 30 MG: 30 INJECTION, SOLUTION INTRAMUSCULAR; INTRAVENOUS at 17:31

## 2022-10-21 ASSESSMENT — PAIN - FUNCTIONAL ASSESSMENT
PAIN_FUNCTIONAL_ASSESSMENT: 0-10
PAIN_FUNCTIONAL_ASSESSMENT: 0-10

## 2022-10-21 ASSESSMENT — PAIN SCALES - GENERAL
PAINLEVEL_OUTOF10: 8
PAINLEVEL_OUTOF10: 7
PAINLEVEL_OUTOF10: 8

## 2022-10-21 ASSESSMENT — PAIN DESCRIPTION - LOCATION: LOCATION: HEAD

## 2022-10-21 NOTE — ED NOTES
BLAKE Daly CNP has discussed with pt LMP prior to injection      Anyi Fuller, LUCIANA  10/21/22 4667

## 2022-10-21 NOTE — ED TRIAGE NOTES
To room 1 c/o frontal headache  LMP 9/6/22.  Sexually active no birthcontrol use states periods are irregular

## 2022-10-21 NOTE — ED PROVIDER NOTES
[address] Encounter      CHIEFCOMPLAINT       Chief Complaint   Patient presents with    Headache     Right  and left frontal    Letter for School/Work       Nurses Notes reviewed and I agree except as noted in the HPI. HISTORY OF PRESENT ILLNESS   Bre Luz is a 25 y.o. female who presents care complaining of mild headache over the last 2 days. Denies fever body aches chills. Denies any sudden onset. She has had similar headaches in the past.  Tried Tylenol Tylenol without much relief. .  Cough. Denies nasal congestion. Denies sore throat. Requesting a note as she left work early last evening. REVIEW OF SYSTEMS     Review of Systems   Constitutional:  Negative for chills, fatigue, fever and unexpected weight change. HENT:  Negative for congestion, postnasal drip, rhinorrhea, sinus pressure, sinus pain, sneezing and sore throat. Eyes: Negative. Respiratory:  Negative for shortness of breath and wheezing. Cardiovascular:  Negative for chest pain. Gastrointestinal:  Negative for abdominal pain, constipation, diarrhea, nausea and vomiting. Endocrine: Negative. Genitourinary:  Negative for difficulty urinating, dyspareunia, dysuria, hematuria, urgency, vaginal bleeding, vaginal discharge and vaginal pain. Musculoskeletal:  Negative for myalgias. Skin:  Negative for rash. Neurological:  Positive for headaches. Negative for dizziness and light-headedness. Hematological:  Negative for adenopathy. Psychiatric/Behavioral:  Negative for agitation. PAST MEDICAL HISTORY         Diagnosis Date    Depression        SURGICAL HISTORY     Patient  has a past surgical history that includes cyst removal and [place] tooth extraction.     CURRENT MEDICATIONS       Discharge Medication List as of 10/21/2022  5:40 PM        CONTINUE these medications which have NOT CHANGED    Details   cyclobenzaprine (FLEXERIL) 5 MG tablet Take 1 tablet by mouth 3 times daily as needed for Muscle spasms, Disp-12 tablet, R-0Normal      ketorolac (TORADOL) 10 MG tablet Take 1 tablet by mouth every 6 hours as needed for Pain, Disp-15 tablet, R-0Normal             ALLERGIES     Patient is has No Known Allergies. FAMILY HISTORY     Patient'sfamily history includes Cancer in her mother; Depression in her mother; No Known Problems in her father; Substance Abuse in her mother. SOCIAL HISTORY     Patient  reports that she has been smoking cigarettes. She has never used smokeless tobacco. She reports current alcohol use. She reports current drug use. Drug: Marijuana [place]e). PHYSICAL EXAM     ED TRIAGE VITALS  BP: 109/61, Temp: 98.7 °F (37.1 °C), Heart Rate: 94, Resp: 16, SpO2: 97 %  Physical Exam  Vitals and nursing note reviewed. Constitutional:       General: She is not in acute distress. Appearance: Normal appearance. She is not ill-appearing or toxic-appearing. HENT:      Head: Normocephalic and atraumatic. Nose: No congestion or rhinorrhea. Mouth/Throat:      Mouth: Mucous membranes are moist.      Pharynx: Oropharynx is clear. Eyes:      Extraocular Movements: Extraocular movements intact. Conjunctiva/sclera: Conjunctivae normal.      Pupils: Pupils are equal, round, and reactive to light. Cardiovascular:      Rate and Rhythm: Normal rate and regular rhythm. Pulses: Normal pulses. Heart sounds: Normal heart sounds. No murmur heard. Pulmonary:      Effort: Pulmonary effort is normal. No respiratory distress. Breath sounds: Normal breath sounds. No wheezing. Abdominal:      General: Abdomen is flat. Palpations: Abdomen is soft. Tenderness: There is no abdominal tenderness. Musculoskeletal:         General: No swelling or deformity. Normal range of motion. Cervical back: Normal range of motion and neck supple. Skin:     General: Skin is warm and dry. Capillary Refill: Capillary refill takes less than 2 seconds. Findings: No rash. Neurological:      General: No focal deficit present. Mental Status: She is alert and oriented to person, place, and time. Mental status is at baseline. Psychiatric:         Mood and Affect: Mood normal.         Behavior: Behavior normal.         Thought Content: Thought content normal.         Judgment: Judgment normal.       DIAGNOSTIC RESULTS   Labs:No results found for this visit on 10/21/22. IMAGING:  No orders to display     URGENT CARE COURSE:         Medications   ketorolac (TORADOL) injection 30 mg (30 mg IntraMUSCular Given 10/21/22 3158)     PROCEDURES:  FINALIMPRESSION      1. Acute nonintractable headache, unspecified headache type        DISPOSITION/PLAN   DISPOSITION Decision To Discharge 10/21/2022 05:40:23 PM    Patient did have relief from Toradol in urgent care. No Focal neurologic deficit. Take did not have a sudden onset of HA. She has had headaches like like this in the past.  Close follow-up with primary care provider. Return to ER with new or severe symptoms. PATIENT REFERRED TO:  No follow-up provider specified.   DISCHARGE MEDICATIONS:  Discharge Medication List as of 10/21/2022  5:40 PM        Discharge Medication List as of 10/21/2022  5:40 PM          Arley Garcia, APRN - JUAN         [facility], APRN - CNP  10/21/22 [facility], APRN - CNP  11/07/22 1516

## 2022-10-21 NOTE — Clinical Note
Darryle Pastor was seen and treated in our emergency department on 10/21/2022. She may return to work on 10/22/2022. If you have any questions or concerns, please don't hesitate to call.       Veronica Marino, NI - CNP

## 2022-11-07 ASSESSMENT — ENCOUNTER SYMPTOMS
WHEEZING: 0
EYES NEGATIVE: 1
DIARRHEA: 0
VOMITING: 0
SINUS PRESSURE: 0
NAUSEA: 0
SHORTNESS OF BREATH: 0
SINUS PAIN: 0
RHINORRHEA: 0
SORE THROAT: 0
ABDOMINAL PAIN: 0
CONSTIPATION: 0

## 2023-01-22 ENCOUNTER — APPOINTMENT (OUTPATIENT)
Dept: GENERAL RADIOLOGY | Age: 23
End: 2023-01-22
Payer: COMMERCIAL

## 2023-01-22 ENCOUNTER — HOSPITAL ENCOUNTER (EMERGENCY)
Age: 23
Discharge: HOME | End: 2023-01-22
Attending: STUDENT IN AN ORGANIZED HEALTH CARE EDUCATION/TRAINING PROGRAM
Payer: COMMERCIAL

## 2023-01-22 VITALS
RESPIRATION RATE: 14 BRPM | HEART RATE: 98 BPM | DIASTOLIC BLOOD PRESSURE: 72 MMHG | OXYGEN SATURATION: 100 % | TEMPERATURE: 98.3 F | SYSTOLIC BLOOD PRESSURE: 133 MMHG

## 2023-01-22 DIAGNOSIS — M79.605 LEFT LEG PAIN: Primary | ICD-10-CM

## 2023-01-22 LAB
ANION GAP SERPL CALC-SCNC: 11 MEQ/L (ref 8–16)
B-HCG SERPL QL: NEGATIVE
BACTERIA: ABNORMAL
BASOPHILS ABSOLUTE: 0 THOU/MM3 (ref 0–0.1)
BASOPHILS NFR BLD AUTO: 0.4 %
BILIRUB UR QL STRIP: NEGATIVE
BUN SERPL-MCNC: 16 MG/DL (ref 7–22)
CALCIUM SERPL-MCNC: 8.9 MG/DL (ref 8.5–10.5)
CASTS #/AREA URNS LPF: ABNORMAL /LPF
CASTS #/AREA URNS LPF: ABNORMAL /LPF
CHARACTER UR: CLEAR
CHARCOAL URNS QL MICRO: ABNORMAL
CHLORIDE SERPL-SCNC: 106 MEQ/L (ref 98–111)
CK SERPL-CCNC: 365 U/L (ref 30–135)
CO2 SERPL-SCNC: 23 MEQ/L (ref 23–33)
COLOR UR: YELLOW
CREAT SERPL-MCNC: 0.9 MG/DL (ref 0.4–1.2)
CRYSTALS URNS QL MICRO: ABNORMAL
DEPRECATED RDW RBC AUTO: 42.8 FL (ref 35–45)
EOSINOPHIL NFR BLD AUTO: 1.1 %
EOSINOPHILS ABSOLUTE: 0.1 THOU/MM3 (ref 0–0.4)
EPITHELIAL CELLS, UA: ABNORMAL /HPF
ERYTHROCYTE [DISTWIDTH] IN BLOOD BY AUTOMATED COUNT: 12.4 % (ref 11.5–14.5)
GFR SERPL CREATININE-BSD FRML MDRD: > 60 ML/MIN/1.73M2
GLUCOSE SERPL-MCNC: 89 MG/DL (ref 70–108)
GLUCOSE UR QL STRIP.AUTO: NEGATIVE MG/DL
HCT VFR BLD AUTO: 40.4 % (ref 37–47)
HGB BLD-MCNC: 13.5 GM/DL (ref 12–16)
HGB UR QL STRIP.AUTO: NEGATIVE
IMM GRANULOCYTES # BLD AUTO: 0.01 THOU/MM3 (ref 0–0.07)
IMM GRANULOCYTES NFR BLD AUTO: 0.1 %
KETONES UR QL STRIP.AUTO: NEGATIVE
LEUKOCYTE ESTERASE UR QL STRIP.AUTO: ABNORMAL
LYMPHOCYTES ABSOLUTE: 2.4 THOU/MM3 (ref 1–4.8)
LYMPHOCYTES NFR BLD AUTO: 32 %
MCH RBC QN AUTO: 31.4 PG (ref 26–33)
MCHC RBC AUTO-ENTMCNC: 33.4 GM/DL (ref 32.2–35.5)
MCV RBC AUTO: 94 FL (ref 81–99)
MONOCYTES ABSOLUTE: 0.5 THOU/MM3 (ref 0.4–1.3)
MONOCYTES NFR BLD AUTO: 6.6 %
NEUTROPHILS NFR BLD AUTO: 59.8 %
NITRITE UR QL STRIP.AUTO: NEGATIVE
NRBC BLD AUTO-RTO: 0 /100 WBC
OSMOLALITY SERPL CALC.SUM OF ELEC: 280.1 MOSMOL/KG (ref 275–300)
PH UR STRIP.AUTO: 6 [PH] (ref 5–9)
PLATELET # BLD AUTO: 262 THOU/MM3 (ref 130–400)
PMV BLD AUTO: 9.6 FL (ref 9.4–12.4)
POTASSIUM SERPL-SCNC: 4.6 MEQ/L (ref 3.5–5.2)
PROT UR STRIP.AUTO-MCNC: NEGATIVE MG/DL
RBC # BLD AUTO: 4.3 MILL/MM3 (ref 4.2–5.4)
RBC #/AREA URNS HPF: ABNORMAL /HPF
RENAL EPI CELLS #/AREA URNS HPF: ABNORMAL /[HPF]
SEGMENTED NEUTROPHILS ABSOLUTE COUNT: 4.4 THOU/MM3 (ref 1.8–7.7)
SODIUM SERPL-SCNC: 140 MEQ/L (ref 135–145)
SP GR UR REFRACT.AUTO: 1.02 (ref 1–1.03)
UROBILINOGEN UR QL STRIP.AUTO: 0.2 EU/DL (ref 0–1)
WBC # BLD AUTO: 7.4 THOU/MM3 (ref 4.8–10.8)
WBC #/AREA URNS HPF: ABNORMAL /HPF
YEAST LIKE FUNGI URNS QL MICRO: ABNORMAL

## 2023-01-22 PROCEDURE — 84703 CHORIONIC GONADOTROPIN ASSAY: CPT

## 2023-01-22 PROCEDURE — 85025 COMPLETE CBC W/AUTO DIFF WBC: CPT

## 2023-01-22 PROCEDURE — 81001 URINALYSIS AUTO W/SCOPE: CPT

## 2023-01-22 PROCEDURE — 73590 X-RAY EXAM OF LOWER LEG: CPT

## 2023-01-22 PROCEDURE — 36415 COLL VENOUS BLD VENIPUNCTURE: CPT

## 2023-01-22 PROCEDURE — 99284 EMERGENCY DEPT VISIT MOD MDM: CPT

## 2023-01-22 PROCEDURE — 82550 ASSAY OF CK (CPK): CPT

## 2023-01-22 PROCEDURE — 80048 BASIC METABOLIC PNL TOTAL CA: CPT

## 2023-01-22 PROCEDURE — 6370000000 HC RX 637 (ALT 250 FOR IP): Performed by: STUDENT IN AN ORGANIZED HEALTH CARE EDUCATION/TRAINING PROGRAM

## 2023-01-22 RX ORDER — HYDROCODONE BITARTRATE AND ACETAMINOPHEN 5; 325 MG/1; MG/1
1 TABLET ORAL ONCE
Status: COMPLETED | OUTPATIENT
Start: 2023-01-22 | End: 2023-01-22

## 2023-01-22 RX ADMIN — HYDROCODONE BITARTRATE AND ACETAMINOPHEN 1 TABLET: 5; 325 TABLET ORAL at 18:51

## 2023-01-22 ASSESSMENT — PAIN DESCRIPTION - ORIENTATION: ORIENTATION: LEFT

## 2023-01-22 ASSESSMENT — PAIN SCALES - GENERAL: PAINLEVEL_OUTOF10: 8

## 2023-01-22 ASSESSMENT — PAIN - FUNCTIONAL ASSESSMENT: PAIN_FUNCTIONAL_ASSESSMENT: 0-10

## 2023-01-22 ASSESSMENT — PAIN DESCRIPTION - LOCATION: LOCATION: LEG

## 2023-01-22 NOTE — ED PROVIDER NOTES
[address] [facility]\"" [address] [address] EMERGENCY DEPT      EMERGENCY MEDICINE     Pt Name: Jasmine Lopez  MRN: [id number]  Birthdate 2000  Date of evaluation: 1/22/2023  Provider: Xavier Valdes MD  Supervising Physician: Riky Adams       Chief Complaint   Patient presents with    Leg Pain     History obtained from the patient. HISTORY OF PRESENT ILLNESS   Jasmine Lopez is a pleasant 25 y.o. female who presents to the emergency department from from home, by private vehicle for evaluation of leg pain. Patient states that on Thursday, she was struck in her tibial tuberosity by forks of forklift. Pain 6 out of 10, described as a pressure and aching anterior lower leg, radiating into inferior lower leg, worse with ambulation, though she has been able to. States the pain has not really gotten worse, swelling somewhat improved, bruising has somewhat improved, but she is concerned that going back to work with it being in this much pain would be difficult. Denies any additional complaints. Pertinent ROS included above. PASTMEDICAL HISTORY     Past Medical History:   Diagnosis Date    Depression        Patient Active Problem List   Diagnosis Code    Depression with suicidal ideation F32. A, R45.851    Bipolar disorder with severe bina ([facility] [address] F31.13    Cannabis abuse, daily use F12.10    Chronic post-traumatic stress disorder (PTSD) F43.12     SURGICAL HISTORY       Past Surgical History:   Procedure Laterality Date    CYST REMOVAL      WISDOM TOOTH EXTRACTION         CURRENT MEDICATIONS       Discharge Medication List as of 1/22/2023  8:08 PM        CONTINUE these medications which have NOT CHANGED    Details   cyclobenzaprine (FLEXERIL) 5 MG tablet Take 1 tablet by mouth 3 times daily as needed for Muscle spasms, Disp-12 tablet, R-0Normal      ketorolac (TORADOL) 10 MG tablet Take 1 tablet by mouth every 6 hours as needed for Pain, Disp-15 tablet, R-0Normal             ALLERGIES     has No Known Allergies. FAMILY HISTORY     She indicated that her mother is alive. She indicated that her father is alive. She indicated that the status of her neg hx is unknown. SOCIAL HISTORY       Social History     Tobacco Use    Smoking status: Some Days     Types: Cigarettes    Smokeless tobacco: Never   Vaping Use    Vaping Use: Never used   Substance Use Topics    Alcohol use: Yes     Comment: social    Drug use: Yes     Types: Marijuana (Weed)     Comment: denies       PHYSICAL EXAM       ED Triage Vitals [01/22/23 1742]   BP Temp Temp Source Heart Rate Resp SpO2 Height Weight   133/72 98.3 °F (36.8 °C) Oral 98 14 100 % -- --       Additional Vital Signs:  Vitals:    01/22/23 1742   BP: 133/72   Pulse: 98   Resp: 14   Temp: 98.3 °F (36.8 °C)   SpO2: 100%     Physical Exam  Constitutional:       Appearance: Normal appearance. HENT:      Head: Normocephalic and atraumatic. Right Ear: External ear normal.      Left Ear: External ear normal.      Nose: Nose normal.      Mouth/Throat:      Mouth: Mucous membranes are moist.      Pharynx: Oropharynx is clear. Eyes:      Conjunctiva/sclera: Conjunctivae normal.   Cardiovascular:      Rate and Rhythm: Normal rate and regular rhythm. Pulmonary:      Effort: Pulmonary effort is normal. No respiratory distress. Abdominal:      General: Abdomen is flat. There is no distension. Palpations: Abdomen is soft. Tenderness: There is no abdominal tenderness. Musculoskeletal:      Right lower leg: No edema. Comments: Tenderness to palpation of tibial tuberosity, no tenderness to calf, does seem to have a little bit of swelling to the left lower leg, nonpitting. Skin:     General: Skin is warm and dry. Capillary Refill: Capillary refill takes less than 2 seconds. Neurological:      Mental Status: She is alert. Mental status is at baseline.        FORMAL DIAGNOSTIC RESULTS     RADIOLOGY: Interpretation per the Radiologist below, if available at the time of this note (none if blank):    XR TIBIA FIBULA LEFT (2 VIEWS)   Final Result    No fracture. **This report has been created using voice recognition software. It may contain minor errors which are inherent in voice recognition technology. **      Final report electronically signed by Dr Garrett Beckham on 1/22/2023 7:07 PM          LABS: (none if blank)  Labs Reviewed   CK - Abnormal; Notable for the following components:       Result Value    Total  (*)     All other components within normal limits   MICROSCOPIC URINALYSIS - Abnormal; Notable for the following components:    Leukocytes, UA TRACE (*)     All other components within normal limits   BASIC METABOLIC PANEL   CBC WITH AUTO DIFFERENTIAL   HCG, SERUM, QUALITATIVE   ANION GAP   GLOMERULAR FILTRATION RATE, ESTIMATED   OSMOLALITY       (Any cultures that may have been sent were not resulted at the time of this patient visit)    [address] / ED COURSE:     Assessment and Plan: This is a 25 y.o. female with no significant past medical history denting with left lower leg injury . Physical exam significant for tenderness to palpation of tibial tuberosity, no tenderness to palpation of the calf. Differential diagnoses include, but not limited to traumatic rhabdomyolysis, fracture. Given mechanism of injury, also considered tibial plateau fracture, compartment syndrome, though unlikely given reassuring exam.   Patient given Norco with slight improvement in pain. Labs significant for mild CK elevation, no other gross abnormalities. Imaging significant for negative XR. Patient likely has contusion from injury. Recommended that she continue with resting icing compressing with Ace bandage, and elevating. Recommended follow-up with [facility] if pain persist, return to ED if any worsening of condition.     Vitals Reviewed:    Vitals:    01/22/23 1742   BP: 133/72   Pulse: 98   Resp: 14   Temp: 98.3 °F (36.8 °C)   TempSrc: Oral SpO2: 100%       The patient was seen and examined. Appropriate diagnostic testing was performed and results reviewed with the patient. Nursing notes reviewed. The results of pertinent diagnostic studies and exam findings were discussed. The patients provisional diagnosis and plan of care were discussed with the patient and present family who expressed understanding. Any medications were reviewed and indications and risks of medications were discussed with the patient /family present. ED Medications administered this visit:  (None if blank)  Medications   HYDROcodone-acetaminophen (NORCO) 5-325 MG per tablet 1 tablet (1 tablet Oral Given 1/22/23 3614)         DISCHARGE PRESCRIPTIONS: (None if blank)  Discharge Medication List as of 1/22/2023  8:08 PM          FINAL IMPRESSION      1. Left leg pain          DISPOSITION/PLAN     Final diagnoses:   Left leg pain     Condition: condition: good  Dispo: Discharge to home        OUTPATIENT FOLLOW UP THE PATIENT:  [facility], PCP  No follow-up provider specified. Strict return precautions and follow-up discussed with patient. This transcription was electronically signed. Parts of this transcriptions may have been dictated by use of voice recognition software and electronically transcribed, and parts may have been transcribed with the assistance of an ED scribe. The transcription may contain errors not detected in proofreading. Please refer to my supervising physician's documentation if my documentation differs.     Electronically Signed: Bri Sifuentes MD, 01/23/23, 3:30 AM        Bri Sifuentes MD  Resident  01/23/23 1894

## 2023-01-22 NOTE — Clinical Note
Michaela Mccabe was seen and treated in our emergency department on 1/22/2023. She may return to work on 01/23/2023. If you have any questions or concerns, please don't hesitate to call.       Pedro Oates MD

## 2023-02-13 ENCOUNTER — HOSPITAL ENCOUNTER (INPATIENT)
Age: 23
LOS: 1 days | Discharge: HOME | DRG: 204 | End: 2023-02-14
Attending: EMERGENCY MEDICINE | Admitting: PSYCHIATRY & NEUROLOGY
Payer: COMMERCIAL

## 2023-02-13 ENCOUNTER — HOSPITAL ENCOUNTER (EMERGENCY)
Age: 23
Discharge: ANOTHER HOSPITAL | End: 2023-02-13
Attending: STUDENT IN AN ORGANIZED HEALTH CARE EDUCATION/TRAINING PROGRAM
Payer: COMMERCIAL

## 2023-02-13 VITALS
OXYGEN SATURATION: 96 % | SYSTOLIC BLOOD PRESSURE: 111 MMHG | RESPIRATION RATE: 17 BRPM | HEART RATE: 56 BPM | DIASTOLIC BLOOD PRESSURE: 69 MMHG | TEMPERATURE: 98.4 F

## 2023-02-13 DIAGNOSIS — R55 NEUROCARDIOGENIC SYNCOPE: ICD-10-CM

## 2023-02-13 DIAGNOSIS — R55 SYNCOPE AND COLLAPSE: Primary | ICD-10-CM

## 2023-02-13 DIAGNOSIS — S09.90XD CLOSED HEAD INJURY, SUBSEQUENT ENCOUNTER: ICD-10-CM

## 2023-02-13 DIAGNOSIS — R94.01 EEG ABNORMAL: Primary | ICD-10-CM

## 2023-02-13 LAB
ANION GAP SERPL CALC-SCNC: 11 MEQ/L (ref 8–16)
BASOPHILS ABSOLUTE: 0 THOU/MM3 (ref 0–0.1)
BASOPHILS NFR BLD AUTO: 0.4 %
BUN SERPL-MCNC: 9 MG/DL (ref 7–22)
CALCIUM SERPL-MCNC: 9.3 MG/DL (ref 8.5–10.5)
CHLORIDE SERPL-SCNC: 101 MEQ/L (ref 98–111)
CO2 SERPL-SCNC: 23 MEQ/L (ref 23–33)
CREAT SERPL-MCNC: 0.6 MG/DL (ref 0.4–1.2)
DEPRECATED RDW RBC AUTO: 44.1 FL (ref 35–45)
EOSINOPHIL NFR BLD AUTO: 1.3 %
EOSINOPHILS ABSOLUTE: 0.1 THOU/MM3 (ref 0–0.4)
ERYTHROCYTE [DISTWIDTH] IN BLOOD BY AUTOMATED COUNT: 12.4 % (ref 11.5–14.5)
GFR SERPL CREATININE-BSD FRML MDRD: > 60 ML/MIN/1.73M2
GLUCOSE SERPL-MCNC: 98 MG/DL (ref 70–108)
HCT VFR BLD AUTO: 45.3 % (ref 37–47)
HGB BLD-MCNC: 15 GM/DL (ref 12–16)
IMM GRANULOCYTES # BLD AUTO: 0.02 THOU/MM3 (ref 0–0.07)
IMM GRANULOCYTES NFR BLD AUTO: 0.2 %
LYMPHOCYTES ABSOLUTE: 3 THOU/MM3 (ref 1–4.8)
LYMPHOCYTES NFR BLD AUTO: 35.2 %
MCH RBC QN AUTO: 31.8 PG (ref 26–33)
MCHC RBC AUTO-ENTMCNC: 33.1 GM/DL (ref 32.2–35.5)
MCV RBC AUTO: 96 FL (ref 81–99)
MONOCYTES ABSOLUTE: 0.5 THOU/MM3 (ref 0.4–1.3)
MONOCYTES NFR BLD AUTO: 5.3 %
NEUTROPHILS NFR BLD AUTO: 57.6 %
NRBC BLD AUTO-RTO: 0 /100 WBC
PLATELET # BLD AUTO: 326 THOU/MM3 (ref 130–400)
PMV BLD AUTO: 9.2 FL (ref 9.4–12.4)
POTASSIUM SERPL-SCNC: 3.6 MEQ/L (ref 3.5–5.2)
RBC # BLD AUTO: 4.72 MILL/MM3 (ref 4.2–5.4)
SEGMENTED NEUTROPHILS ABSOLUTE COUNT: 4.9 THOU/MM3 (ref 1.8–7.7)
SODIUM SERPL-SCNC: 135 MEQ/L (ref 135–145)
WBC # BLD AUTO: 8.5 THOU/MM3 (ref 4.8–10.8)

## 2023-02-13 PROCEDURE — 6360000002 HC RX W HCPCS: Performed by: STUDENT IN AN ORGANIZED HEALTH CARE EDUCATION/TRAINING PROGRAM

## 2023-02-13 PROCEDURE — 96375 TX/PRO/DX INJ NEW DRUG ADDON: CPT

## 2023-02-13 PROCEDURE — 36415 COLL VENOUS BLD VENIPUNCTURE: CPT

## 2023-02-13 PROCEDURE — 80048 BASIC METABOLIC PNL TOTAL CA: CPT

## 2023-02-13 PROCEDURE — 95700 EEG CONT REC W/VID EEG TECH: CPT

## 2023-02-13 PROCEDURE — 2580000003 HC RX 258: Performed by: STUDENT IN AN ORGANIZED HEALTH CARE EDUCATION/TRAINING PROGRAM

## 2023-02-13 PROCEDURE — 93010 ELECTROCARDIOGRAM REPORT: CPT | Performed by: INTERNAL MEDICINE

## 2023-02-13 PROCEDURE — 2580000003 HC RX 258

## 2023-02-13 PROCEDURE — 95711 VEEG 2-12 HR UNMONITORED: CPT

## 2023-02-13 PROCEDURE — 99285 EMERGENCY DEPT VISIT HI MDM: CPT

## 2023-02-13 PROCEDURE — 6360000002 HC RX W HCPCS

## 2023-02-13 PROCEDURE — 96374 THER/PROPH/DIAG INJ IV PUSH: CPT

## 2023-02-13 PROCEDURE — 85025 COMPLETE CBC W/AUTO DIFF WBC: CPT

## 2023-02-13 PROCEDURE — 2060000000 HC ICU INTERMEDIATE R&B

## 2023-02-13 PROCEDURE — 93005 ELECTROCARDIOGRAM TRACING: CPT

## 2023-02-13 PROCEDURE — 99223 1ST HOSP IP/OBS HIGH 75: CPT | Performed by: PSYCHIATRY & NEUROLOGY

## 2023-02-13 RX ORDER — POLYETHYLENE GLYCOL 3350 17 G/17G
17 POWDER, FOR SOLUTION ORAL DAILY PRN
Status: DISCONTINUED | OUTPATIENT
Start: 2023-02-13 | End: 2023-02-14 | Stop reason: HOSPADM

## 2023-02-13 RX ORDER — 0.9 % SODIUM CHLORIDE 0.9 %
1000 INTRAVENOUS SOLUTION INTRAVENOUS ONCE
Status: COMPLETED | OUTPATIENT
Start: 2023-02-13 | End: 2023-02-13

## 2023-02-13 RX ORDER — DIPHENHYDRAMINE HYDROCHLORIDE 50 MG/ML
25 INJECTION INTRAMUSCULAR; INTRAVENOUS ONCE
Status: COMPLETED | OUTPATIENT
Start: 2023-02-13 | End: 2023-02-13

## 2023-02-13 RX ORDER — ONDANSETRON 2 MG/ML
4 INJECTION INTRAMUSCULAR; INTRAVENOUS EVERY 6 HOURS PRN
Status: DISCONTINUED | OUTPATIENT
Start: 2023-02-13 | End: 2023-02-14 | Stop reason: HOSPADM

## 2023-02-13 RX ORDER — ACETAMINOPHEN 325 MG/1
650 TABLET ORAL EVERY 6 HOURS PRN
Status: DISCONTINUED | OUTPATIENT
Start: 2023-02-13 | End: 2023-02-14 | Stop reason: HOSPADM

## 2023-02-13 RX ORDER — SODIUM CHLORIDE 9 MG/ML
INJECTION, SOLUTION INTRAVENOUS PRN
Status: DISCONTINUED | OUTPATIENT
Start: 2023-02-13 | End: 2023-02-14 | Stop reason: HOSPADM

## 2023-02-13 RX ORDER — ACETAMINOPHEN 650 MG/1
650 SUPPOSITORY RECTAL EVERY 6 HOURS PRN
Status: DISCONTINUED | OUTPATIENT
Start: 2023-02-13 | End: 2023-02-14 | Stop reason: HOSPADM

## 2023-02-13 RX ORDER — SODIUM CHLORIDE 0.9 % (FLUSH) 0.9 %
5-40 SYRINGE (ML) INJECTION EVERY 12 HOURS SCHEDULED
Status: DISCONTINUED | OUTPATIENT
Start: 2023-02-13 | End: 2023-02-14 | Stop reason: HOSPADM

## 2023-02-13 RX ORDER — LORAZEPAM 2 MG/ML
1 INJECTION INTRAMUSCULAR EVERY 6 HOURS PRN
Status: DISCONTINUED | OUTPATIENT
Start: 2023-02-13 | End: 2023-02-14 | Stop reason: HOSPADM

## 2023-02-13 RX ORDER — SODIUM CHLORIDE 0.9 % (FLUSH) 0.9 %
5-40 SYRINGE (ML) INJECTION PRN
Status: DISCONTINUED | OUTPATIENT
Start: 2023-02-13 | End: 2023-02-14 | Stop reason: HOSPADM

## 2023-02-13 RX ORDER — ONDANSETRON 4 MG/1
4 TABLET, ORALLY DISINTEGRATING ORAL EVERY 8 HOURS PRN
Status: DISCONTINUED | OUTPATIENT
Start: 2023-02-13 | End: 2023-02-14 | Stop reason: HOSPADM

## 2023-02-13 RX ORDER — ENOXAPARIN SODIUM 100 MG/ML
40 INJECTION SUBCUTANEOUS DAILY
Status: DISCONTINUED | OUTPATIENT
Start: 2023-02-13 | End: 2023-02-14 | Stop reason: HOSPADM

## 2023-02-13 RX ORDER — METOCLOPRAMIDE HYDROCHLORIDE 5 MG/ML
10 INJECTION INTRAMUSCULAR; INTRAVENOUS ONCE
Status: COMPLETED | OUTPATIENT
Start: 2023-02-13 | End: 2023-02-13

## 2023-02-13 RX ORDER — KETOROLAC TROMETHAMINE 30 MG/ML
15 INJECTION, SOLUTION INTRAMUSCULAR; INTRAVENOUS ONCE
Status: COMPLETED | OUTPATIENT
Start: 2023-02-13 | End: 2023-02-13

## 2023-02-13 RX ADMIN — SODIUM CHLORIDE 1000 ML: 9 INJECTION, SOLUTION INTRAVENOUS at 01:57

## 2023-02-13 RX ADMIN — SODIUM CHLORIDE, PRESERVATIVE FREE 10 ML: 5 INJECTION INTRAVENOUS at 23:14

## 2023-02-13 RX ADMIN — KETOROLAC TROMETHAMINE 15 MG: 30 INJECTION, SOLUTION INTRAMUSCULAR; INTRAVENOUS at 01:59

## 2023-02-13 RX ADMIN — DIPHENHYDRAMINE HYDROCHLORIDE 25 MG: 50 INJECTION, SOLUTION INTRAMUSCULAR; INTRAVENOUS at 01:58

## 2023-02-13 RX ADMIN — METOCLOPRAMIDE 10 MG: 5 INJECTION, SOLUTION INTRAMUSCULAR; INTRAVENOUS at 01:59

## 2023-02-13 RX ADMIN — ENOXAPARIN SODIUM 40 MG: 100 INJECTION SUBCUTANEOUS at 23:15

## 2023-02-13 ASSESSMENT — ENCOUNTER SYMPTOMS
WHEEZING: 0
DIARRHEA: 0
SHORTNESS OF BREATH: 0
BACK PAIN: 0
SORE THROAT: 0
COUGH: 0
ABDOMINAL PAIN: 0
TROUBLE SWALLOWING: 0
NAUSEA: 0
APNEA: 0
VOICE CHANGE: 0
CHEST TIGHTNESS: 0
EYE DISCHARGE: 0
VOMITING: 0

## 2023-02-13 ASSESSMENT — PAIN - FUNCTIONAL ASSESSMENT
PAIN_FUNCTIONAL_ASSESSMENT: 0-10
PAIN_FUNCTIONAL_ASSESSMENT: NONE - DENIES PAIN
PAIN_FUNCTIONAL_ASSESSMENT: NONE - DENIES PAIN
PAIN_FUNCTIONAL_ASSESSMENT: 0-10
PAIN_FUNCTIONAL_ASSESSMENT: NONE - DENIES PAIN

## 2023-02-13 ASSESSMENT — PAIN SCALES - GENERAL
PAINLEVEL_OUTOF10: 7
PAINLEVEL_OUTOF10: 0

## 2023-02-13 ASSESSMENT — PAIN DESCRIPTION - DESCRIPTORS: DESCRIPTORS: ACHING

## 2023-02-13 ASSESSMENT — PAIN DESCRIPTION - LOCATION: LOCATION: HEAD

## 2023-02-13 ASSESSMENT — PAIN DESCRIPTION - PAIN TYPE: TYPE: ACUTE PAIN

## 2023-02-13 NOTE — ED NOTES
Patient resting in bed at this time with eyes closed. Respirations easy and unlabored. No distress noted. Call light within reach.       Zoey Padgett RN  02/13/23 3699

## 2023-02-13 NOTE — ED PROVIDER NOTES
[address] [facility] [address] [address] EMERGENCY DEPT  ED Attending Physician Attestation     I performed a history and physical examination of the patient and discussed management with the Resident. I reviewed the Resident's note and agree with the documented findings and plan of care. Any areas of disagreement are noted on the chart. I was personally present for the key portions of any procedures and have documented in the chart those procedures where I was not present during the key portions. I have reviewed the emergency nurses triage note and agree with the chief complaint, past medical history, past surgical history, allergies, medications, social and family history as documented unless otherwise noted below. Normal sinus rhythm, normal axis, good R wave progression, no ST elevations or depressions, no notable T wave abnormalities, no Q waves, no ID depressions, normal intervals. No delta wave, no epsilon wave, no Brugada wave, no Wellens wave. Normal EKG. Laboratory eval unremarkable. Review of records from [address] [place] [address] notes an abnormal EEG with multiple possible epileptogenic foci noted with recommendation for continuous EEG. Patient had left AGAINST MEDICAL ADVICE from [address] [place] [address]. We do not have continuous EEG available to us at our facility. We attempted to transfer the patient back to [facility] however they are unable to accept the patient until Tuesday afternoon. [facility] did not have any availability either. [facility] neurologist was able to contact us at around 6:40 AM and accepted the patient to their facility. She will be transferred ED to ED. We spoke with the accepting ED physician Dr. Benton Alves who accepted the patient for transfer. No seizure-like activity throughout ED stay.     Noam Hayes MD,   Attending Emergency Physician       Noam Hayes MD  02/13/23 6015

## 2023-02-13 NOTE — CARE COORDINATION
Case Management Assessment  Initial Evaluation    Date/Time of Evaluation: 2/13/2023 2:23 PM  Assessment Completed by: Latrice Campoverde RN    If patient is discharged prior to next notation, then this note serves as note for discharge by case management. Patient Name: Selena Berman                   YOB: 2000  Diagnosis: Syncope and collapse [R55]                   Date / Time: 2/13/2023 12:49 PM    Patient Admission Status: Inpatient   Readmission Risk (Low < 19, Mod (19-27), High > 27): No data recorded  Current PCP: No primary care provider on file. PCP verified by CM? (P) Yes (does not know name of PCP but relates it is [address] [facility] [place] [address] and last visit was less than 2 years ago)    Chart Reviewed: Yes      History Provided by: (P) Patient  Patient Orientation: (P) Alert and Oriented    Patient Cognition: (P) Alert    Hospitalization in the last 30 days (Readmission):  No    If yes, Readmission Assessment in CM Navigator will be completed.     Advance Directives:      Code Status: Prior   Patient's Primary Decision Maker is:  self      Discharge Planning:    Patient lives with: (P) Spouse/Significant Other Type of Home: (P) House  Primary Care Giver: (P) Self  Patient Support Systems include: (P) Spouse/Significant Other   Current Financial resources: (P) Medicaid  Current community resources:    Current services prior to admission: (P) None            Current DME:              Type of Home Care services:  (P) None    ADLS  Prior functional level: (P) Independent in ADLs/IADLs  Current functional level: (P) Independent in ADLs/IADLs    PT AM-PAC:   /24  OT AM-PAC:   /24    Family can provide assistance at DC: (P) Yes  Would you like Case Management to discuss the discharge plan with any other family members/significant others, and if so, who? (P) No  Plans to Return to Present Housing: (P) Yes  Other Identified Issues/Barriers to RETURNING to current housing: none noted  Potential Assistance needed at discharge: (P) N/A            Potential DME:    Patient expects to discharge to: (P) [address] [facility] for transportation at discharge: (P) [address]: [address]  [address] / Plan: [facility]  [address] / Product Type: *No Product type* /     Does insurance require precert for SNF: Yes    Potential assistance Purchasing Medications: (P) No  Meds-to-Beds request:        [facility] [id number] - [place], [address] [phone] - F [phone]  [address] [facility] [address]  Phone: [phone] Fax: [phone]      Notes:    Factors facilitating achievement of predicted outcomes: Friend support    Barriers to discharge: none noted    Additional Case Management Notes: goal is home has transportation    The Plan for Transition of Care is related to the following treatment goals of Syncope and collapse [I89]    IF APPLICABLE: The Patient and/or patient representative Kinsey and her family were provided with a choice of provider and agrees with the discharge plan. Freedom of choice list with basic dialogue that supports the patient's individualized plan of care/goals and shares the quality data associated with the providers was provided to: (P) Patient   Patient Representative Name:       The Patient and/or Patient Representative Agree with the Discharge Plan?  (P) Yes    Jameel Fitzgerald RN  Case Management Department  Ph: [phone]

## 2023-02-13 NOTE — ED PROVIDER NOTES
[address] [facility] [address] [address] EMERGENCY DEPT      EMERGENCY MEDICINE     Pt Name: Long Puente  MRN: [id number]  Birthdate 2000  Date of evaluation: 2/13/2023  Resident Physician: Glynn Bunch MD  Attending Physician: Dr. Jet Sanders       Chief Complaint   Patient presents with    Fall    Loss of Consciousness    Dizziness     HISTORY OF PRESENT Raisa Nguyen is a 21 y.o. female who presents to the emergency department from home, by private vehicle for evaluation of syncope    Patient had a syncopal episode with head trauma loss of conscious nausea and vomiting on Saturday and was admitted to the [address] [place] [address] facility in [facility] and was being worked up. EEG was found to have possible epileptic form activity and recommendation was for continuous EEG but patient was worried about being stranded in [facility] and it up leaving [address] Patient presenting today the emergency department to be admitted to the floor for additional work-up and testing. Patient does endorse becoming presyncopal when she stood up earlier to this evening but did not pass out. Did not hit her head tonight. Patient does have persistent occipital headache that has not gone away since falling and striking her head. Patient does not have any numbness, weakness, double vision, difficulty speaking, trouble ambulating loss of bladder or bowel function      The patient has no other acute complaints at this time. Review of Systems    Negative Except as Documented Above. PASTMEDICAL HISTORY     Past Medical History:   Diagnosis Date    Depression        Patient Active Problem List   Diagnosis Code    Depression with suicidal ideation F32. A, R45.851    Bipolar disorder with severe bina ([facility] [address] F31.13    Cannabis abuse, daily use F12.10    Chronic post-traumatic stress disorder (PTSD) F43.12     SURGICAL HISTORY       Past Surgical History:   Procedure Laterality Date    CYST REMOVAL      WISDOM TOOTH EXTRACTION CURRENT MEDICATIONS       Previous Medications    CYCLOBENZAPRINE (FLEXERIL) 5 MG TABLET    Take 1 tablet by mouth 3 times daily as needed for Muscle spasms    KETOROLAC (TORADOL) 10 MG TABLET    Take 1 tablet by mouth every 6 hours as needed for Pain       ALLERGIES     has No Known Allergies. FAMILY HISTORY     She indicated that her mother is alive. She indicated that her father is alive. She indicated that the status of her neg hx is unknown. SOCIAL HISTORY       Social History     Tobacco Use    Smoking status: Some Days     Types: Cigarettes    Smokeless tobacco: Never   Vaping Use    Vaping Use: Never used   Substance Use Topics    Alcohol use: Yes     Comment: social    Drug use: Yes     Types: Marijuana (Weed)     Comment: denies       PHYSICAL EXAM       ED Triage Vitals   BP Temp Temp Source Heart Rate Resp SpO2 Height Weight   02/13/23 0047 02/13/23 0101 02/13/23 0101 02/13/23 0047 02/13/23 0047 02/13/23 0047 -- --   (!) 145/83 98.4 °F (36.9 °C) Oral 86 17 100 %         Physical Exam  General: Lying in bed, no obvious distress  HEENT:  normocephalic and atraumatic. No scleral icterus. PERR. EOMI  Neck: Supple. No JVD. No thyromegaly. Lungs: clear to auscultation. No retractions, No evidence of Respiratory Distress   Cardiac: RRR without MGR, Bilateral Radial and DP pulses 2+  Abdomen: soft. Nontender. Nondistended, Bowel Sounds Present  Extremities:  No clubbing, cyanosis, or edema x 4. Cap Refill < 2 Seconds    Skin:  warm and dry. No rashes or bruises  Psych:  Alert and oriented x3. Affect appropriate  Lymph:  No supraclavicular adenopathy. Neurologic:  No focal deficit. No seizures. FORMAL DIAGNOSTIC RESULTS     RADIOLOGY: Interpretation per the Radiologist below, if available at the time of this note (none if blank):     No orders to display       LABS: (none if blank)  Labs Reviewed   CBC WITH AUTO DIFFERENTIAL - Abnormal; Notable for the following components:       Result Value    MPV 9.2 (*)     All other components within normal limits   BASIC METABOLIC PANEL   ANION GAP   GLOMERULAR FILTRATION RATE, ESTIMATED       (Any cultures that may have been sent were not resulted at the time of this patient visit)    [address] [facility] / ED COURSE:     1) Number and Complexity of Problems            Problem List This Visit:         Chief Complaint   Patient presents with    Fall    Loss of Consciousness    Dizziness            Differential Diagnosis includes (but not limited to):  Concussion syndrome, epilepsy                 Pertinent Comorbid Conditions:        2)  Data Reviewed (none if left blank)          My Independent interpretations    Imaging: See Ed Course    Labs:      See Ed Course                 Decision Rules/Clinical Scores utilized:              External Documentation Reviewed:         Previous patient encounter documents & history available on EMR was reviewed              See Formal Diagnostic Results above for the lab and radiology tests and orders. 3)  Treatment and Disposition         ED Reassessment: Patient updated on plan of care and difficulty finding spot for EGD. Patient wishes to continue transfer process. Patient comfortable with no other acute complaints. Please see ED course for further reassessments, treatments, MDM, and final disposition. Case discussed with consulting clinician:           Shared Decision-Making was performed and disposition discussed with the        Patient/Family and questions answered          Social determinants of health impacting treatment or disposition:           Code Status:        Summary of Patient Presentation:      MDM   /   ED Course as of 02/13/23 0725   Mon Feb 13, 2023   0723 No acute abnormalities identified today. Patient boarded in the ED for several hours pending accepting facility for continuous EEG. Patient finally accepted at [facility]. [facility] by Dr. Yuval Sosa for her continuous EEG.   Patient is in agreement of returning to [place]. No acute complaints at this time. Patient will be signed out to my colleague Dr. Levy Henley. Please see his note for any changes to MDM and disposition. [PATRICE]      ED Course User Index  [PATRICE] Renetta Yeager MD     Vitals Reviewed:    Vitals:    02/13/23 8369 02/13/23 9529 02/13/23 0534 02/13/23 0648   BP: 94/63 103/66 112/74 104/62   Pulse: 62 60 59 55   Resp: 14 15 18 18   Temp:       TempSrc:       SpO2: 99% 99% 100% 98%       The patient was seen and examined. Appropriate diagnostic testing was performed and results reviewed with the patient. The results of pertinent diagnostic studies and exam findings were discussed. The patients provisional diagnosis and plan of care were discussed with the patient and present family who expressed understanding. Any medications were reviewed and indications and risks of medications were discussed with the patient /family present. Strict verbal and written return precautions, instructions and appropriate follow-up provided to  the patient . ED Medications administered this visit:  (None if blank)  Medications   0.9 % sodium chloride bolus (1,000 mLs IntraVENous New Bag 2/13/23 0157)   diphenhydrAMINE (BENADRYL) injection 25 mg (25 mg IntraVENous Given 2/13/23 0158)   metoclopramide (REGLAN) injection 10 mg (10 mg IntraVENous Given 2/13/23 0159)   ketorolac (TORADOL) injection 15 mg (15 mg IntraVENous Given 2/13/23 0159)         PROCEDURES: (None if blank)  Procedures:     CRITICAL CARE: (Please see Attending note / Qian Estrella regarding Critical Care Time. )      DISCHARGE PRESCRIPTIONS: (None if blank)  New Prescriptions    No medications on file       FINAL IMPRESSION      1. Syncope and collapse    2. Closed head injury, subsequent encounter          DISPOSITION/PLAN   DISPOSITION Decision To Transfer 02/13/2023 06:53:14 AM      OUTPATIENT FOLLOW UP THE PATIENT:  No follow-up provider specified.     Morales Rondon MD    This transcription was electronically signed. Parts of this transcriptions may have been dictated by use of voice recognition software and electronically transcribed, and parts may have been transcribed with the assistance of an ED scribe. The transcription may contain errors not detected in proofreading. Please refer to my supervising physician's documentation if my documentation differs.     Electronically Signed: Addis Nolasco MD, 02/13/23, 7:25 AM        Melanie Padilla MD  Resident  02/13/23 9844

## 2023-02-13 NOTE — ED NOTES
Chief complaint: AAA s/p EVAR  Chief Complaint   Patient presents with   • Follow-up     F/u EVAR 11/29 ok per Dr. Vieira frequent urination, not sleeping well      HISTORY OF PRESENT ILLNESS     Patient is a 65 year old male with a history of hyperlipidemia, hypertension and abdominal aortic aneurysm status post endovascular repair of 5.3 cm infrarenal AAA with Endologix AFX2 Unibody Bifurcated distal aorta / bilateral common iliac bifurcating stent overlapped with Aguirre suprarenal aortic extension covered stent. Today patient presents to clinic for follow up and reports doing well overall. Does note he has not been sleeping well recently, only sleeping approx 3 hours a night. He states this may be contributing to his elevated blood pressures at home, which have systolic pressures regularly in 140's and 150's. Otherwise notes he has been compliant with current medication regimen. Denies chest pain, palpitations or shortness of breath.  Denies orthopnea, headache, dizziness or syncope. No claudication or leg edema. No other complaints at this time.       PAST MEDICAL, FAMILY AND SOCIAL HISTORY     I have personally reviewed and updated the following EPIC sections: Current medications, Allergies, Problem list, Past Medical History, Past Surgical History, Social History and Family History    REVIEW OF SYSTEMS     Review of Systems   Constitutional: Negative.  Negative for activity change, fatigue and unexpected weight change.   Respiratory: Negative.  Negative for chest tightness and shortness of breath.    Cardiovascular: Negative.  Negative for chest pain, palpitations and leg swelling.   Endocrine: Negative.    Musculoskeletal: Negative.    Skin: Negative.    Neurological: Negative.  Negative for dizziness, syncope and light-headedness.   Hematological: Negative.  Does not bruise/bleed easily.   All other systems reviewed and are negative.    PHYSICAL EXAM     Vitals:    01/05/18 0921   BP: 140/80   Pulse: 70  Patient resting in bed with eyes shut. Respirations easy and unlabored. No distress noted. Call light within reach.      Maty Beach RN  02/13/23 1523     Physical Exam   Constitutional: He is oriented to person, place, and time. He appears well-developed and well-nourished. No distress.   HENT:   Head: Normocephalic and atraumatic.   Eyes: Conjunctivae are normal. Pupils are equal, round, and reactive to light. Left eye exhibits no discharge. No scleral icterus.   Neck: Normal range of motion. Neck supple. No JVD present. No tracheal deviation present. No thyromegaly present.   Cardiovascular: Normal rate, regular rhythm, S1 normal, S2 normal, normal heart sounds, intact distal pulses and normal pulses.    No murmur heard.  Pulses:       Femoral pulses are 2+ on the right side, and 2+ on the left side.  Pulmonary/Chest: Effort normal and breath sounds normal. No respiratory distress. He has no wheezes. He has no rales. He exhibits no tenderness.   Abdominal: Soft. Bowel sounds are normal. He exhibits no distension. There is no tenderness.   Musculoskeletal: He exhibits no edema.   Lymphadenopathy:     He has no cervical adenopathy.   Neurological: He is alert and oriented to person, place, and time. No cranial nerve deficit.   Skin: Skin is warm and dry. No rash noted. He is not diaphoretic. No erythema.   Right groin incision well healed   Psychiatric: He has a normal mood and affect. His behavior is normal. Judgment and thought content normal.   Vitals reviewed.    IMAGING/RESULTS     CHOLESTEROL (mg/dL)   Date Value   09/28/2017 137     HDL (mg/dL)   Date Value   09/28/2017 39 (L)     CHOL/HDL (no units)   Date Value   09/28/2017 3.5     TRIGLYCERIDE (mg/dL)   Date Value   09/28/2017 248 (H)     CALCULATED LDL (mg/dL)   Date Value   09/28/2017 48       Recent Labs  Lab 11/30/17  0340 11/29/17  0650 09/28/17  0930   HGB 13.7 15.6 16.1   BUN 13 14 15   CREATININE 0.61* 0.74 0.77   HGBA1C  --   --  6.0*   POTASSIUM 4.1 4.0 4.0   CHOLESTEROL  --   --  137   HDL  --   --  39*   CHOHDL  --   --  3.5   TRIGLYCERIDE  --   --  248*   CALCLDL  --   --  48   AST  --   --   15   GPT  --   --  29     CTA abdomen: 10/11/2017   IMPRESSION:  1. Fusiform aneurysmal dilatation of the mid to distal infrarenal abdominal aorta measuring up to 5.0 cm in maximum transverse diameter with no acute  aortic findings.  2. High-grade ostial stenosis of the more inferior accessory left renal artery.  3. Diffuse atherosclerotic disease of mild severity with no evidence of a hemodynamically significant inflow disease. High-grade ostial stenosis of  the left internal iliac artery.  4. Simple right renal cyst.  5. Sigmoid diverticulosis with no evidence of diverticulitis.  6. Enlarged prostate.     Screening US aorta  9/28/2017   IMPRESSION:  5.2 cm infrarenal abdominal aortic aneurysm.    CT Chest 9/28/2017   showed Coronary artery calcification (limited assessment on this non-gated CT):   Moderate to severe.    EVAR 11/29/2017  Procedure performed:  Endovascular aneurysm repair of 5.3 cm infrarenal AAA w/:  -Endologix  AFX2 Unibody Bifurcated distal aorta / bilateral common iliac bifurcating stent (28mm diameter in aortic region, 16 mm in B/L iliacs, 100 mm aortic length and 40 mm B/L iliac length)  -Overlapped w/: Aguirre suprarenal aortic extension covered stent  (measuring 28 mm diameter, 95 mm covered, 20 mm infrarenal uncovered)    CTA Abdomen/Pelvis 11/29/2017  1. Widely patent endovascular stent graft  2. No endovascular leak is identified  3. The residual aneurysm measures 5.2 x 4.1 cm at the L4 level.  4. Compared to the prior study of October 11, 2017 there has been interim placement of an endovascular graft. In side by side comparison there is no significant change in the size of residual aneurysm.      ASSESSMENT/PLAN     Abdominal Aortic Aneurysm, infrarenal  Status post endovascular repair of 5.3 cm infrarenal AAA with Endologix AFX2 Unibody Bifurcated distal aorta / bilateral common iliac bifurcating stent overlapped with Aguirre suprarenal aortic extension covered stent 11/2017.   Repeat CTA  11/2017 showed patent stent graft with no endovascular leak.   Right groin incision well healed and is asymptomatic.   Will continue to monitor with repeat CTA in 1 year.   Otherwise repeat creatinine today as follow up and will call with results.     Hypertension  Elevated today during visit.   Reports home systolic pressures typically run in 140-150's and may be secondary to insomnia recently.   Rechecked at 150/80 mmHg.  Remains on amlodipine 10 mg, carvedilol 6.25 mg BID.   Patient declines dosing adjustments or addition of new medications at this time and would like to be treated for his sleeping difficulties first.   Advised to otherwise have Ambulatory 24 hour blood pressure monitor in 1 month for follow up and call office if pressures remain elevated at home    Dyslipidemia  Last Fasting lipid panel reviewed.   Continue rosuvastatin 20 mg daily.   Also encouraged to maintain cardiac diet and exercise regularly.        Return in about 1 year (around 1/5/2019) for follow up with CTA prior. Will call with blood pressure monitor results in 1 month. Call or return to clinic as needed if symptoms worsen, fail to improve as anticipated, or if new symptoms develop.       On 01/05/18, ITolu scribed the services personally performed by Bishop Vieira MD     Note: The documentation recorded by the scribe accurately and completely reflects the service(s) I personally performed and the decisions made MD Bishop Villeda MD   Cardiology

## 2023-02-13 NOTE — ED NOTES
Report called to [facility] ER, this RN spoke with [facility] for report.       Puneet Granados RN  02/13/23 5609

## 2023-02-13 NOTE — ED NOTES
20 yo F  Syncopal,   possible epileptic event,   Had eval at [facility], left ama, returned to [facility] with Dr Gemma Davalos neurology who request ER to ER  Vss / neuro intact,      Ty Huber, RN  02/13/23 5910

## 2023-02-13 NOTE — ED NOTES
No needs expressed at this time. Patient resting in bed. Respirations easy and unlabored. Still waiting on acceptance to transfer. Call light within reach.       Miladis Castro RN  02/13/23 0072

## 2023-02-13 NOTE — ED NOTES
20 yo F  Syncopal,   possible epileptic event,   Had eval at [facility], left ama, returned to [facility] with Dr Marium Powell neurology who request ER to ER  Vss / neuro intact,      Rayray Chavez RN  02/13/23 2709

## 2023-02-13 NOTE — H&P
[id number] [facility] Neurology   [address] [facility]    HISTORY AND PHYSICAL EXAMINATION            Date:   2/13/2023  Patient name:  Melissa Durbin  Date of admission:  2/13/2023 12:49 PM  MRN:   [id number]  Account:  [de-identified]  YOB: 2000  PCP:    No primary care provider on file. Room:   /  Code Status:    Prior    Chief Complaint:     Chief Complaint   Patient presents with    Dizziness    Loss of Consciousness        History Obtained From:     patient      History of Present Illness: The patient is a 21 y.o. Non- / non  female who presents with Dizziness and Loss of Consciousness  . HPI  49-year-old female without significant past medical history was transferred from outlying facility after EEG showed slowing left more than right frontotemporal.    Patient stated that on Saturday to 2/11/2023 she was inside the car with her boyfriend suddenly started to feel hot, sweaty, and feeling like passing out she got out of the car, and she passed out hitting her head on the pavement. Her boyfriend was who mentioned that she was having mild shaking, and her eyes were rolled backward, episode lasted 2 to 3 minutes. After getting back into the car she had another episode of passing out lasting up to 5 minutes. Patient was taken to [address] [place] [address],[address] where she underwent CT head, MRI brain with without contrast, echo, MRI C-spine unremarkable, echo 60 to 65% ejection fraction. EEG: Showed intermittent slowing left more than right frontotemporal.  Patient left AMA from [address] [place] [address],[address].  Last night 2/12/2023 patient had another episode of passing out, patient was taken to [facility] and transferred to [facility] for long-term video EEG monitoring. Patient states that she had 3 episodes of similar nature first thing 2 years ago, second 4 months ago, third 1 month ago.     Patient has history of seizure when she was 1years of age, details not available. Denies any head trauma, abnormal birth history as far as she remembers. Patient does have family history of seizure in mother, grandmother, and aunt. Patient denies tongue bite, bowel bladder incontinence, postictal confusion. Patient smokes weed, and filter drip, denies any recreational drugs, OC pills. On evaluation patient alert oriented x4  Nonfocal neurological exam    Past Medical History:     Past Medical History:   Diagnosis Date    Depression         Past Surgical History:     Past Surgical History:   Procedure Laterality Date    CYST REMOVAL      WISDOM TOOTH EXTRACTION          Medications Prior to Admission:     Prior to Admission medications    Medication Sig Start Date End Date Taking? Authorizing Provider   cyclobenzaprine (FLEXERIL) 5 MG tablet Take 1 tablet by mouth 3 times daily as needed for Muscle spasms  Patient not taking: Reported on 10/21/2022 5/17/22   Nadia Lanes, PA-C   ketorolac (TORADOL) 10 MG tablet Take 1 tablet by mouth every 6 hours as needed for Pain  Patient not taking: Reported on 10/21/2022 5/17/22   Nadia Lanes, PA-C        Allergies:     Patient has no known allergies. Social History:     Tobacco:    reports that she has been smoking cigarettes. She has never used smokeless tobacco.  Alcohol:      reports current alcohol use. Drug Use:  reports current drug use. Drug: Marijuana Majaymieta Bushra). Family History:     Family History   Problem Relation Age of Onset    Cancer Mother     Depression Mother     Substance Abuse Mother     No Known Problems Father     Arthritis Neg Hx     Diabetes Neg Hx     High Blood Pressure Neg Hx        Review of Systems:     ROS:    Review of Systems   Constitutional:  Negative for activity change, chills, fever and unexpected weight change. HENT:  Negative for congestion, hearing loss and sore throat. Eyes:  Negative for discharge and visual disturbance.    Respiratory:  Negative for cough, chest tightness, shortness of breath and wheezing. Cardiovascular:  Negative for chest pain, palpitations and leg swelling. Gastrointestinal:  Negative for abdominal pain, diarrhea, nausea and vomiting. Endocrine: Negative for polydipsia and polyphagia. Genitourinary:  Negative for decreased urine volume, difficulty urinating, dysuria, frequency and vaginal pain. Musculoskeletal:  Negative for arthralgias, back pain, joint swelling and neck stiffness. Skin:  Negative for rash. Allergic/Immunologic: Negative for environmental allergies. Neurological:  Negative for dizziness, tremors, seizures, syncope, facial asymmetry, speech difficulty, weakness, light-headedness, numbness and headaches. Hematological:  Negative for adenopathy. Psychiatric/Behavioral:  Negative for agitation, confusion, hallucinations and suicidal ideas. Physical Exam:   /73   Pulse 66   Temp 98.3 °F (36.8 °C) (Oral)   Resp 16   SpO2 100%   Temp (24hrs), Av.4 °F (36.9 °C), Min:98.3 °F (36.8 °C), Max:98.4 °F (36.9 °C)    No results for input(s): POCGLU in the last 72 hours. No intake or output data in the 24 hours ending 23 1400    General examination:      General Appearance:  alert, well appearing, and in no acute distress  HEENT: Normocephalic, atraumatic, moist mucus membranes  Neck: supple, no carotid bruits, (-) nuchal rigidity  Lungs:  Respirations unlabored, chest wall no deformity, BS normal  Cardiovascular: normal rate, regular rhythm  Abdomen: Soft, nontender, nondistended, normal bowel sounds  Skin: No gross lesions, rashes, bruising or bleeding on exposed skin area  Extremities:  peripheral pulses palpable, clubbing or edema  Psych: normal affect    NEUROLOGIC EXAMINATION    Mental status   Alert and oriented x 3; following all commands;   speech is fluent, no dysarthria, aphasia.       Cranial nerves   II - visual fields intact to confrontation; pupils reactive  III, IV, VI - extraocular muscles intact; no MIYA; no nystagmus; no ptosis   V - normal facial sensation                                                               VII - normal facial symmetry                                                             VIII - intact hearing                                                                             IX, X - symmetrical palate elevation                                               XI - symmetrical shoulder shrug                                                       XII - midline tongue without atrophy or fasciculation     Motor function  Strength:   5/5 RUE, 5/5 RLE  5/5 LUE, 5/5  LLE  Normal bulk and tone. Sensory function Intact to touch, pin, vibration, proprioception throughout     Cerebellar Intact finger-nose-finger testing. Intact heel-shin testing. No dysdiadochokinesia present. No tremors                        Reflex function 2/4 symmetric throughout . Downgoing plantar response bilaterally. (-)Mendoza's sign bilaterally      Gait                  Normal station and gait.           Investigations:      Laboratory Testing:  Recent Results (from the past 24 hour(s))   CBC with Auto Differential    Collection Time: 02/13/23  1:31 AM   Result Value Ref Range    WBC 8.5 4.8 - 10.8 thou/mm3    RBC 4.72 4.20 - 5.40 mill/mm3    Hemoglobin 15.0 12.0 - 16.0 gm/dl    Hematocrit 45.3 37.0 - 47.0 %    MCV 96.0 81.0 - 99.0 fL    MCH 31.8 26.0 - 33.0 pg    MCHC 33.1 32.2 - 35.5 gm/dl    RDW-CV 12.4 11.5 - 14.5 %    RDW-SD 44.1 35.0 - 45.0 fL    Platelets 469 522 - 577 thou/mm3    MPV 9.2 (L) 9.4 - 12.4 fL    Seg Neutrophils 57.6 %    Lymphocytes 35.2 %    Monocytes 5.3 %    Eosinophils 1.3 %    Basophils 0.4 %    Immature Granulocytes 0.2 %    Segs Absolute 4.9 1.8 - 7.7 thou/mm3    Lymphocytes Absolute 3.0 1.0 - 4.8 thou/mm3    Monocytes Absolute 0.5 0.4 - 1.3 thou/mm3    Eosinophils Absolute 0.1 0.0 - 0.4 thou/mm3    Basophils Absolute 0.0 0.0 - 0.1 thou/mm3    Immature Grans (Abs) 0.02 0.00 - 0.07 thou/mm3    nRBC 0 /100 wbc   BMP    Collection Time: 23  1:31 AM   Result Value Ref Range    Sodium 135 135 - 145 meq/L    Potassium 3.6 3.5 - 5.2 meq/L    Chloride 101 98 - 111 meq/L    CO2 23 23 - 33 meq/L    Glucose 98 70 - 108 mg/dL    BUN 9 7 - 22 mg/dL    Creatinine 0.6 0.4 - 1.2 mg/dL    Calcium 9.3 8.5 - 10.5 mg/dL   Anion Gap    Collection Time: 23  1:31 AM   Result Value Ref Range    Anion Gap 11.0 8.0 - 16.0 meq/L   Glomerular Filtration Rate, Estimated    Collection Time: 23  1:31 AM   Result Value Ref Range    Est, Glom Filt Rate >60 >60 ml/min/1.73m2   EKG 12 Lead    Collection Time: 23  1:41 AM   Result Value Ref Range    Ventricular Rate 62 BPM    Atrial Rate 62 BPM    P-R Interval 164 ms    QRS Duration 86 ms    Q-T Interval 396 ms    QTc Calculation (Bazett) 401 ms    P Axis 65 degrees    R Axis 70 degrees    T Axis 53 degrees         CBC:   Recent Labs     23  013   WBC 8.5   HGB 15.0          BMP:    Recent Labs     23  013      K 3.6      CO2 23   BUN 9   CREATININE 0.6   GLUCOSE 98         Lab Results   Component Value Date    ALT 16 2022    AST 13 2022    TSH 1.650 2018         Imaging/Diagnostics:      EE 2023  EEG CLIASSIFICATION:     Abnormal (Awake and Drowsy)   - Intermittent slowing regional left more than right frontotemporal   - Negative sharp transients regional left temporofrontal        CLINICAL CORRELATION:     This study is abnormal.  It shows evidences to indicate possible cortical   abnormality in the bilateral frontotemporal regions. A possible regional   irritative lesion with potential of epileptogenicity especially on the   left side cannot be excluded. Further investigation with prolonged video   EEG study is recommended to assist further diagnosis. Clinical   correlation is recommended. CT head: 2023  No evidence of an acute intracranial process.        MRI C-spine: 2/12/2023    No evidence of compression fracture or traumatic malalignment in the cervical spine. No definitive evidence of ligamentous injury. MRI Brain with without contrast: 2/12/2023    No acute intracranial abnormality or abnormal enhancement. 2D ECHO 2/12/2023     Left Ventricle: Systolic function is normal with an ejection fraction   of 60-65%. Mitral Valve: There is no regurgitation or stenosis. Aortic Valve: There is no regurgitation or stenosis. Right Ventricle: Systolic function is normal.     Tricuspid Valve: There is trace regurgitation. There is no evidence of   tricuspid valve stenosis. Pericardium: There is no pericardial effusion. Assessment :      Primary Problem  Syncope and collapse    Active Hospital Problems    Diagnosis Date Noted    Syncope and collapse [R55] 02/13/2023     Priority: Medium       Plan:     Patient status Admit as inpatient in the  Progressive Unit/Step down    Syncopal episode followed by mild shaking  Patient was transferred from [facility] after EEG showed intermittent left more than right frontotemporal slowing  Admitted for long-term EEG for capturing events/spell  Seizure precaution  Fall precaution  PT/OT  No driving for at least 6 months.   No heavy lifting for at least 6 months  No bathing or swimming unsupervised  Avoid locking doors, it prevents some to help you if needed  Avoid stairs unsupervised  Avoid cooking unsupervised          DVT prophylaxis: Lovenox 40 mg SC  GI prophylaxis:  Not indicated  Code status: Full code    Consultations:   IP CONSULT TO NEUROLOGY  PT/OT     Nursing:  Vital signs per unit routine  DIET: Regular  Fall precautions  Orthostatic blood pressure   Place intermittent pneumatic compression device         Patient was  staffed with the attending Dr. Jaleesa Han       Patient is admitted as inpatient status because of co-morbidities listed above, severity of signs and symptoms as outlined, requirement for current medical therapies and most importantly because of direct risk to patient if care not provided in a hospital setting. Antonella Macias MD   PGY 4 Neurology Resident  2/13/2023 at 2:00 PM    Copy sent to Dr. Ness primary care provider on file.

## 2023-02-13 NOTE — ED PROVIDER NOTES
[facility]  ED     Emergency Department     Faculty Attestation    I performed a history and physical examination of the patient and discussed management with the resident. I reviewed the residents note and agree with the documented findings and plan of care. Any areas of disagreement are noted on the chart. I was personally present for the key portions of any procedures. I have documented in the chart those procedures where I was not present during the key portions. I have reviewed the emergency nurses triage note. I agree with the chief complaint, past medical history, past surgical history, allergies, medications, social and family history as documented unless otherwise noted below. For Physician Assistant/ Nurse Practitioner cases/documentation I have personally evaluated this patient and have completed at least one if not all key elements of the E/M (history, physical exam, and MDM). Additional findings are as noted. Patient transferred from [facility] for neurology evaluation and admission for possible seizure activity and LTM E. No issues during transport on arrival awake alert and oriented appropriate normal pupils normal speech.   Neuro team at bedside will admit as planned      Critical Care     none    Bhargav Mera MD, Jade Alfredo  Attending Emergency  Physician           Bhargav Mera MD  02/13/23 [id number]

## 2023-02-13 NOTE — ED NOTES
Patient presents to ED as a transfer from [address] [place] [address]. Patient went to [facility]. Mehreen's with complaints of multiple syncopal episodes that were witnessed by her fiance. Upon evaluation at [address] [place] [address] was concerned for seizures. Upon evaluation here, patient has no neuro deficits and vital signs are all within desired limits. Patient is alert and oriented x4 and ambulates with a steady gait. Dr. Vinita Salazar at [facility]. Neuro team contacted.      Patricia Sue RN  02/13/23 0116

## 2023-02-13 NOTE — ED NOTES
[facility]ACP at bedside to transfer pt to [facility]. Pt denies questions or concerns at this time.       Omer Colorado RN  02/13/23 5141

## 2023-02-13 NOTE — ED NOTES
Patient resting in bed. Respirations easy and unlabored. No distress noted. Call light within reach.      Sophia Moore RN  02/13/23 9641

## 2023-02-13 NOTE — ED TRIAGE NOTES
Patient presents to the ED with chief complaint of a fall, syncope, headache and dizziness. Patient reports she was in [place] last night, got out of her car, passed out and hit her head. Denies being on blood thinners. Patient reports she was seen last night at [facility] where they did a work up and diagnosed her with a concussion. Patient resting in bed. Respirations easy and unlabored. No distress noted. Call light within reach.

## 2023-02-13 NOTE — ED NOTES
Contacted [address] [place] [address] about continual EEG monitoring. Both facilities have extended wait times.  [facility] to call back with any other options     Petros Luong  02/13/23 2971

## 2023-02-13 NOTE — ED NOTES
Pt appears to be resting comfortably on cot. Pt respirations easy and unlabored.      Helder Mackay RN  02/13/23 7190

## 2023-02-13 NOTE — ED PROVIDER NOTES
[facility]  ED  Emergency Department Encounter  Emergency Medicine Resident     Pt Name:Kinsey Walker  MRN: [id number]  Ashgfradha 2000  Date of evaluation: 2/13/23  PCP:  No primary care provider on file. Note Started: 1:08 PM EST      CHIEF COMPLAINT       Chief Complaint   Patient presents with    Dizziness    Loss of Consciousness       HISTORY OF PRESENT ILLNESS  (Location/Symptom, Timing/Onset, Context/Setting, Quality, Duration, Modifying Factors, Severity.)      Buddy Jones is a 21 y.o. female who presents with as a transfer from [place] [address]. Patient was evaluated twice over the past week for what appeared to be syncopal events however was found on EEG to have epileptiform discharges that were inconclusive. After her second event went back to the emergency department after leaving against medical advice the first time for reevaluation. Decision at that time was to transfer the patient after speaking with neurology for LTME. Patient does endorse that both times that she has had her significant events was when she was drinking. Denies trauma, does endorse loss of consciousness however. PAST MEDICAL / SURGICAL / SOCIAL / FAMILY HISTORY      has a past medical history of Depression. has a past surgical history that includes cyst removal and Atkinson tooth extraction.       Social History     Socioeconomic History    Marital status: Single     Spouse name: Not on file    Number of children: Not on file    Years of education: Not on file    Highest education level: Not on file   Occupational History    Not on file   Tobacco Use    Smoking status: Some Days     Types: Cigarettes    Smokeless tobacco: Never   Vaping Use    Vaping Use: Never used   Substance and Sexual Activity    Alcohol use: Yes     Comment: social    Drug use: Yes     Types: Marijuana Brandi Eglin)     Comment: denies    Sexual activity: Yes     Partners: Male   Other Topics Concern    Not on file   Social History Narrative    Not on file     Social Determinants of Health     Financial Resource Strain: Not on file   Food Insecurity: Not on file   Transportation Needs: Not on file   Physical Activity: Not on file   Stress: Not on file   Social Connections: Not on file   Intimate Partner Violence: Not on file   Housing Stability: Not on file       Family History   Problem Relation Age of Onset    Cancer Mother     Depression Mother     Substance Abuse Mother     No Known Problems Father     Arthritis Neg Hx     Diabetes Neg Hx     High Blood Pressure Neg Hx        Allergies:  Patient has no known allergies. Home Medications:  Prior to Admission medications    Medication Sig Start Date End Date Taking? Authorizing Provider   cyclobenzaprine (FLEXERIL) 5 MG tablet Take 1 tablet by mouth 3 times daily as needed for Muscle spasms  Patient not taking: Reported on 10/21/2022 5/17/22   Jerri Sultana PA-C   ketorolac (TORADOL) 10 MG tablet Take 1 tablet by mouth every 6 hours as needed for Pain  Patient not taking: Reported on 10/21/2022 5/17/22   Jerri Sultana PA-C         REVIEW OF SYSTEMS       Review of Systems   Constitutional:  Negative for activity change, appetite change and fever. HENT:  Negative for congestion, tinnitus, trouble swallowing and voice change. Respiratory:  Negative for apnea, cough and chest tightness. Cardiovascular: Negative. Skin: Negative. Neurological:  Positive for seizures, syncope and headaches. Negative for dizziness. PHYSICAL EXAM      INITIAL VITALS:   /73   Pulse 66   Temp 98.3 °F (36.8 °C) (Oral)   Resp 16   SpO2 100%     Physical Exam  Vitals reviewed. Constitutional:       Appearance: Normal appearance. HENT:      Head: Normocephalic and atraumatic. Nose: Nose normal.      Mouth/Throat:      Mouth: Mucous membranes are moist.      Pharynx: Oropharynx is clear. Eyes:      Extraocular Movements: Extraocular movements intact. Conjunctiva/sclera: Conjunctivae normal.      Pupils: Pupils are equal, round, and reactive to light. Cardiovascular:      Rate and Rhythm: Normal rate and regular rhythm. Heart sounds: Normal heart sounds. Musculoskeletal:      Cervical back: Normal range of motion and neck supple. Skin:     General: Skin is warm and dry. Neurological:      General: No focal deficit present. Mental Status: She is alert. Mental status is at baseline. Psychiatric:         Mood and Affect: Mood normal.         Behavior: Behavior normal.         DDX/DIAGNOSTIC RESULTS / EMERGENCY DEPARTMENT COURSE / MDM     Medical Decision Making  Patient here with concerning epileptiform discharges on EEG. Transfer from [facility]. We will consult neurology. And await for official recommendations from their service. EKG      All EKG's are interpreted by the Emergency Department Physician who either signs or Co-signs this chart in the absence of a cardiologist.    EMERGENCY DEPARTMENT COURSE:           PROCEDURES:      CONSULTS:  IP CONSULT TO NEUROLOGY    CRITICAL CARE:  There was significant risk of life threatening deterioration of patient's condition requiring my direct management. Critical care time 0 minutes, excluding any documented procedures. FINAL IMPRESSION      1. EEG abnormal          DISPOSITION / PLAN     DISPOSITION Admitted 02/13/2023 01:33:13 PM      PATIENT REFERRED TO:  No follow-up provider specified.     DISCHARGE MEDICATIONS:  New Prescriptions    No medications on file       Carolyn Kirk MD  Emergency Medicine Resident    (Please note that portions of thisnote were completed with a voice recognition program.  Efforts were made to edit the dictations but occasionally words are mis-transcribed.)        Carolyn Kirk MD  Resident  02/13/23 4622

## 2023-02-14 VITALS
SYSTOLIC BLOOD PRESSURE: 117 MMHG | HEART RATE: 73 BPM | OXYGEN SATURATION: 99 % | RESPIRATION RATE: 16 BRPM | BODY MASS INDEX: 27.08 KG/M2 | HEIGHT: 66 IN | WEIGHT: 168.5 LBS | DIASTOLIC BLOOD PRESSURE: 78 MMHG | TEMPERATURE: 98.2 F

## 2023-02-14 PROBLEM — R56.9 SEIZURE (HCC): Status: ACTIVE | Noted: 2023-02-14

## 2023-02-14 LAB
ABSOLUTE EOS #: 0.09 K/UL (ref 0–0.44)
ABSOLUTE IMMATURE GRANULOCYTE: <0.03 K/UL (ref 0–0.3)
ABSOLUTE LYMPH #: 2.11 K/UL (ref 1.1–3.7)
ABSOLUTE MONO #: 0.47 K/UL (ref 0.1–1.2)
ANION GAP SERPL CALCULATED.3IONS-SCNC: 15 MMOL/L (ref 9–17)
BASOPHILS # BLD: 1 % (ref 0–2)
BASOPHILS ABSOLUTE: 0.03 K/UL (ref 0–0.2)
BUN SERPL-MCNC: 10 MG/DL (ref 6–20)
CALCIUM SERPL-MCNC: 9.2 MG/DL (ref 8.6–10.4)
CHLORIDE SERPL-SCNC: 104 MMOL/L (ref 98–107)
CO2 SERPL-SCNC: 19 MMOL/L (ref 20–31)
CREAT SERPL-MCNC: 0.56 MG/DL (ref 0.5–0.9)
EOSINOPHILS RELATIVE PERCENT: 2 % (ref 1–4)
GFR SERPL CREATININE-BSD FRML MDRD: >60 ML/MIN/1.73M2
GLUCOSE SERPL-MCNC: 109 MG/DL (ref 70–99)
HCT VFR BLD AUTO: 41 % (ref 36.3–47.1)
HGB BLD-MCNC: 13.5 G/DL (ref 11.9–15.1)
IMMATURE GRANULOCYTES: 0 %
LYMPHOCYTES # BLD: 37 % (ref 24–43)
MCH RBC QN AUTO: 31.2 PG (ref 25.2–33.5)
MCHC RBC AUTO-ENTMCNC: 32.9 G/DL (ref 28.4–34.8)
MCV RBC AUTO: 94.7 FL (ref 82.6–102.9)
MONOCYTES # BLD: 8 % (ref 3–12)
NRBC AUTOMATED: 0 PER 100 WBC
PDW BLD-RTO: 11.9 % (ref 11.8–14.4)
PLATELET # BLD AUTO: 284 K/UL (ref 138–453)
PMV BLD AUTO: 9.7 FL (ref 8.1–13.5)
POTASSIUM SERPL-SCNC: 4 MMOL/L (ref 3.7–5.3)
RBC # BLD: 4.33 M/UL (ref 3.95–5.11)
SEG NEUTROPHILS: 52 % (ref 36–65)
SEGMENTED NEUTROPHILS ABSOLUTE COUNT: 2.94 K/UL (ref 1.5–8.1)
SODIUM SERPL-SCNC: 138 MMOL/L (ref 135–144)
WBC # BLD AUTO: 5.7 K/UL (ref 3.5–11.3)

## 2023-02-14 PROCEDURE — 2580000003 HC RX 258: Performed by: STUDENT IN AN ORGANIZED HEALTH CARE EDUCATION/TRAINING PROGRAM

## 2023-02-14 PROCEDURE — 95720 EEG PHY/QHP EA INCR W/VEEG: CPT | Performed by: PSYCHIATRY & NEUROLOGY

## 2023-02-14 PROCEDURE — 85025 COMPLETE CBC W/AUTO DIFF WBC: CPT

## 2023-02-14 PROCEDURE — 36415 COLL VENOUS BLD VENIPUNCTURE: CPT

## 2023-02-14 PROCEDURE — 80048 BASIC METABOLIC PNL TOTAL CA: CPT

## 2023-02-14 PROCEDURE — 99233 SBSQ HOSP IP/OBS HIGH 50: CPT | Performed by: PSYCHIATRY & NEUROLOGY

## 2023-02-14 PROCEDURE — 95714 VEEG EA 12-26 HR UNMNTR: CPT

## 2023-02-14 RX ADMIN — SODIUM CHLORIDE, PRESERVATIVE FREE 5 ML: 5 INJECTION INTRAVENOUS at 09:00

## 2023-02-14 ASSESSMENT — PAIN SCALES - GENERAL: PAINLEVEL_OUTOF10: 0

## 2023-02-14 NOTE — PROGRESS NOTES
[facility]  Occupational Therapy Not Seen Note    DATE: 2023    NAME: Leola Pierce  MRN: [id number]   : 2000      Patient not seen this date for Occupational Therapy due to:    Patient independent with ADLs and functional tasks with no acute OT needs. Will defer OT evaluation at this time. Please reorder OT if future needs arise.        Electronically signed by Mejia Steve OT on 2023 at 12:04 PM

## 2023-02-14 NOTE — PROGRESS NOTES
[facility] Neurology   IN-PATIENT SERVICE      NEUROLOGY PROGRESS  NOTE            Date:   2/14/2023  Patient name:  Denae Jennings  Date of admission:  2/13/2023  YOB: 2000      Interval History:   Denae Jennings is a  21 y.o. female admitted on 2/13/2023 with Syncope and collapse [R55]  EEG abnormal [R94.01]. This is a follow-up neurology progress note. The patient was seen and examined and the chart was reviewed. Vitals: Stable   No acute events overnight  Overnight LTM E did not show any epileptiform discharges, further read still pending. History of Present Illness:     26-year-old female without significant past medical history [facility] EEG showed slowing left more than right frontotemporal.     Patient stated that on Saturday to 2/11/2023 she was inside the car with her boyfriend suddenly started to feel hot, sweaty, and feeling like passing out she got out of the car, and she passed out hitting her head on the pavement. Her boyfriend was who mentioned that she was having mild shaking, and her eyes were rolled backward, episode lasted 2 to 3 minutes. After getting back into the car she had another episode of passing out lasting up to 5 minutes. Patient was taken to [address] [place] [address],[address] where she underwent CT head, MRI brain with without contrast, echo, MRI C-spine unremarkable, echo 60 to 65% ejection fraction. EEG: Showed intermittent slowing left more than right frontotemporal.  Patient left AMA from [address] [place] [address],[address].  Last night 2/12/2023 patient had another episode of passing out, patient was taken to [facility] and transferred to [facility] for long-term video EEG monitoring. Patient states that she had 3 episodes of similar nature first thing 2 years ago, second 4 months ago, third 1 month ago. Patient has history of seizure when she was 1years of age, details not available.   Denies any head trauma, abnormal birth history as far as she remembers. Patient does have family history of seizure in mother, grandmother, and aunt. Patient denies tongue bite, bowel bladder incontinence, postictal confusion. Patient smokes weed, and filter drip, denies any recreational drugs, OC pills. On evaluation patient alert oriented x4  Nonfocal neurological exam           Past Medical History:     Past Medical History:   Diagnosis Date    Depression     Seizures ([address]     as a small child        Past Surgical History:     Past Surgical History:   Procedure Laterality Date    CYST REMOVAL      WISDOM TOOTH EXTRACTION          Medications during admission:      sodium chloride flush  5-40 mL IntraVENous 2 times per day    enoxaparin  40 mg SubCUTAneous Daily         Physical Exam:   /72   Pulse 67   Temp 98.2 °F (36.8 °C) (Oral)   Resp 18   Ht 5' 6\" (1.676 m)   Wt 168 lb 8 oz (76.4 kg)   SpO2 99%   BMI 27.20 kg/m²   Temp (24hrs), Av.2 °F (36.8 °C), Min:98 °F (36.7 °C), Max:98.3 °F (36.8 °C)        General examination:      General Appearance:  alert, well appearing, and in no acute distress  HEENT: Normocephalic, atraumatic, moist mucus membranes  Neck: supple, no carotid bruits, (-) nuchal rigidity  Lungs:  Respirations unlabored, chest wall no deformity, BS normal  Cardiovascular: normal rate, regular rhythm  Abdomen: Soft, nontender, nondistended, normal bowel sounds  Skin: No gross lesions, rashes, bruising or bleeding on exposed skin area  Extremities:  peripheral pulses palpable, clubbing or edema  Psych: normal affect      Neurological examination:      Mental status   Alert and oriented x 3; following all commands;   speech is fluent, no dysarthria, aphasia.       Cranial nerves   II - visual fields intact to confrontation; pupils reactive  III, IV, VI - extraocular muscles intact; no MIYA; no nystagmus; no ptosis   V - normal facial sensation                                                               VII - normal facial symmetry                                                             VIII - intact hearing                                                                             IX, X - symmetrical palate elevation                                               XI - symmetrical shoulder shrug                                                       XII - midline tongue without atrophy or fasciculation     Motor function  Strength:   5/5 RUE, 5/5 RLE  5/5 LUE, 5/5  LLE  Normal bulk and tone. Sensory function Intact to touch, pin, vibration, proprioception throughout     Cerebellar Intact finger-nose-finger testing. Intact heel-shin testing. No dysdiadochokinesia present. No tremors                        Reflex function 2/4 symmetric throughout . Downgoing plantar response bilaterally. (-)Mendoza's sign bilaterally      Gait                  Unable to assess as patient is on LTM E             Diagnostics:      Laboratory Testing:  CBC:   Recent Labs     23  0131 23  0852   WBC 8.5 5.7   HGB 15.0 13.5    284       BMP:    Recent Labs     23  0131 23  0852    138   K 3.6 4.0    104   CO2 23 19*   BUN 9 10   CREATININE 0.6 0.56   GLUCOSE 98 109*         Lab Results   Component Value Date    ALT 16 2022    AST 13 2022    TSH 1.650 2018         No results found for: PHENYTOIN, PHENYTOIN, VALPROATE, CBMZ        Imaging/Diagnostics:      LTM: Official read pending    EE 2023  EEG CLIASSIFICATION:     Abnormal (Awake and Drowsy)   - Intermittent slowing regional left more than right frontotemporal   - Negative sharp transients regional left temporofrontal        CLINICAL CORRELATION:     This study is abnormal.  It shows evidences to indicate possible cortical   abnormality in the bilateral frontotemporal regions. A possible regional   irritative lesion with potential of epileptogenicity especially on the   left side cannot be excluded.   Further investigation with prolonged video   EEG study is recommended to assist further diagnosis. Clinical   correlation is recommended. CT head: 2/12/2023  No evidence of an acute intracranial process. MRI C-spine: 2/12/2023     No evidence of compression fracture or traumatic malalignment in the cervical spine. No definitive evidence of ligamentous injury. MRI Brain with without contrast: 2/12/2023    No acute intracranial abnormality or abnormal enhancement. 2D ECHO 2/12/2023      Left Ventricle: Systolic function is normal with an ejection fraction   of 60-65%. Mitral Valve: There is no regurgitation or stenosis. Aortic Valve: There is no regurgitation or stenosis. Right Ventricle: Systolic function is normal.     Tricuspid Valve: There is trace regurgitation. There is no evidence of   tricuspid valve stenosis. Pericardium: There is no pericardial effusion. Impression:      Primary Problem  Neurocardiogenic syncope    Active Hospital Problems    Diagnosis Date Noted    Seizure ([facility] [address] [R56.9] 02/14/2023     Priority: Medium    Neurocardiogenic syncope [R55] 02/13/2023     Priority: Medium    EEG abnormal [R94.01] 02/13/2023     Priority: Medium       Assessment       Patient status Admit as inpatient in the  Progressive Unit/Step down    Neurocardiogenic syncope  Convulsive syncope  Syncopal episode followed by shaking  Plan:     Syncopal episode followed by mild shaking  Patient was transferred from [facility] after EEG showed intermittent left more than right frontotemporal slowing  Admitted for long-term EEG for capturing events/spell  Cardiology consulted for tilt table test  Orthostatic vital  Seizure precaution  Fall precaution  PT/OT  No driving for at least 6 months.   No heavy lifting for at least 6 months  No bathing or swimming unsupervised  Avoid locking doors, it prevents some to help you if needed  Avoid stairs unsupervised  Avoid cooking unsupervised          DVT prophylaxis: Lovenox 40 mg SC  GI prophylaxis:  not indicated  Code status: Full code    Consultations:   IP CONSULT TO NEUROLOGY  IP CONSULT TO CARDIOLOGY  PT/OT            Electronically signed by Bozena Connolly MD on 2/14/2023 at 3:08 PM      Jeffery Hollis MD  PGY-4 Neurology Resident   [facility]    2/14/2023 at 3:08 PM

## 2023-02-14 NOTE — DISCHARGE SUMMARY
[address] [place] [address]     Department of Neurology    INPATIENT DISCHARGE SUMMARY        Patient Identification:  Audrey De La Cruz is a 21 y.o. female. :  2000  MRN: [id number]     Acct: [de-identified]   Admit Date:  2023  Discharge date and time: 2023  6:15 PM   Attending Provider: No att. providers found                                     Admission Diagnoses:   Syncope and collapse [R55]  EEG abnormal [R94.01]    Discharge Diagnoses:   Principal Problem:    Neurocardiogenic syncope  Active Problems:    EEG abnormal    Seizure ([address]  Resolved Problems:    * No resolved hospital problems. *       Consults:   cardiology    Brief Inpatient course:    51-year-old female without significant past medical history was transferred from [facility] facility after EEG showed slowing left more than right frontotemporal.     Patient stated that on Saturday to 2023 she was inside the car with her boyfriend suddenly started to feel hot, sweaty, and feeling like passing out she got out of the car, and she passed out hitting her head on the pavement. Her boyfriend was who mentioned that she was having mild shaking, and her eyes were rolled backward, episode lasted 2 to 3 minutes. After getting back into the car she had another episode of passing out lasting up to 5 minutes. Patient was taken to [address] [place] [address],[address] where she underwent CT head, MRI brain with without contrast, echo, MRI C-spine unremarkable, echo 60 to 65% ejection fraction. EEG: Showed intermittent slowing left more than right frontotemporal.  Patient left AMA from [address] [place] [address],[address].  Last night 2023 patient had another episode of passing out, patient was taken to [facility] and transferred to [facility] for long-term video EEG monitoring. Patient states that she had 3 episodes of similar nature first thing 2 years ago, second 4 months ago, third 1 month ago.      Patient has history of seizure when she was 3 years of age, details not available. Denies any head trauma, abnormal birth history as far as she remembers. Patient does have family history of seizure in mother, grandmother, and aunt. Patient denies tongue bite, bowel bladder incontinence, postictal confusion. Patient smokes weed, and filter drip, denies any recreational drugs, OC pills. On evaluation patient alert oriented x4  Nonfocal neurological exam    LTM E did not show any epileptiform discharges. Patient did not wanted to stay for  cardiology and Tilt table test, which she will do outpatient     Procedures:   LTME     Any Hospital Acquired Infections: none    Discharge Functional Status:  stable    Disposition: home    Patient Instructions:   Current Discharge Medication List        CONTINUE these medications which have NOT CHANGED    Details   cyclobenzaprine (FLEXERIL) 5 MG tablet Take 1 tablet by mouth 3 times daily as needed for Muscle spasms  Qty: 12 tablet, Refills: 0      ketorolac (TORADOL) 10 MG tablet Take 1 tablet by mouth every 6 hours as needed for Pain  Qty: 15 tablet, Refills: 0             Activity: activity as tolerated    Diet: regular diet    Follow-up:    [facility]  [address] [place] [address]  Follow up in 1 week(s)  TILT table test    Rose Mckee MD  Neuro [place], [address] [place] [address]  [phone]    Follow up in 6 day(s)      Follow up labs:       Follow up imaging:      Note that over 30 minutes was spent in preparing discharge papers, discussing discharge with patient, medication review, etc.      Rose Mckee MD,  PGY 4 Neurology Resident  Department of Neurology  [address]  2/15/2023, 5:47 PM

## 2023-02-14 NOTE — PROGRESS NOTES
NEUROLOGY ATTENDING ATTESTATION     I have discussed the care of patient including pertinent history and exam findings, with the Neurology resident. I have seen and examined the patient and the key elements of all parts of the encounter have been performed by me. I agree with the assessment, plan and orders as documented by the fellow/resident, after I modified exam findings and plan of treatments, and the final version is my approved version of the assessment. 22 yo lady with episode of unresponsiveness . She reports initially she was sitting in car becoming hot and sweaty . She went out of car becoming lightheaded going out hitting head on pavement noted to have brief seizure . She was sat back in car having another episode of unresponsiveness . She was admitted at [facility] having Head CT normal . CT cervical spine normal . MRI of Head normal . Cardiac 2 D echo normal LVF EF 60-65 % . EEG intermittent slowing left greater than right frontal temporal . Patient left [facility] going back to her home in [facility] [place] . She took shower having another syncopal event taken to [facility] Mehreen's transferred to [facility] for LTME . She reports prior syncope 4 years ago with remote seizure at age 3 . She denies headache or focal motor ,sensory  bulbar or visual complaint   On exam she is alert and oriented wit normal language process. There are normal cranial nerves . There is normal strength with normal cerebellar with normal sensation with  symmetrical reflexes   Impression Neurocardiogenic syncope . Sexier episode . Convulsive syncope versus provoked form hitting head   Plan Await LTME read .  Cardiology consultation with tilt table

## 2023-02-14 NOTE — PROCEDURES
Referring Johana Knight MD  Date: 2/14/2023  Start Time:2/13/2023 @ 1524  End Time: 2/14/2023 @ [phone]    Indication  Patient with possible seizures. Introduction  This continuous video-EEG was acquired using a Gridcentric workstation at 256 samples/s. Electrodes were placed according to the International 10-20 system. Automated spike and seizure detection algorithms were applied. Video was recorded during this study. Description  During the maximal alert state, a well-regulated 9 Hz posterior dominant rhythm was seen which was symmetrical and attenuated to eye opening. No consistent focal slowing or interhemispheric asymmetry was noted. Normal sleep structures were observed. There were no interictal epileptiform discharges or electrographic seizures. Events  No events reported. Impression  Normal continuous video-EEG. EKG lead did not show clear arrhythmia, if still in concern consider formal EKG or correlation with telemetry. No epileptiform discharges were identified. Please note the absence of   such activity in this record cannot conclusively rule out an epileptic   disorder. If such is still clinically suspected, a repeat study with   prolonged sampling may be helpful. Chidi Victoria MD  Epilepsy Board Certified. Neurology Board Certified.     Electronically Signed

## 2023-02-14 NOTE — PLAN OF CARE
Problem: Discharge Planning  Goal: Discharge to home or other facility with appropriate resources  Outcome: Progressing  Flowsheets (Taken 2/13/2023 2238)  Discharge to home or other facility with appropriate resources:   Identify barriers to discharge with patient and caregiver   Arrange for needed discharge resources and transportation as appropriate   Identify discharge learning needs (meds, wound care, etc)   Refer to discharge planning if patient needs post-hospital services based on physician order or complex needs related to functional status, cognitive ability or social support system     Problem: Pain  Goal: Verbalizes/displays adequate comfort level or baseline comfort level  Outcome: Progressing  Flowsheets (Taken 2/13/2023 2238)  Verbalizes/displays adequate comfort level or baseline comfort level:   Encourage patient to monitor pain and request assistance   Assess pain using appropriate pain scale   Administer analgesics based on type and severity of pain and evaluate response   Implement non-pharmacological measures as appropriate and evaluate response   Consider cultural and social influences on pain and pain management   Notify Licensed Independent Practitioner if interventions unsuccessful or patient reports new pain     Problem: Safety - Adult  Goal: Free from fall injury  Outcome: Progressing  Flowsheets (Taken 2/13/2023 2238)  Free From Fall Injury:   Instruct family/caregiver on patient safety   Based on caregiver fall risk screen, instruct family/caregiver to ask for assistance with transferring infant if caregiver noted to have fall risk factors

## 2023-02-14 NOTE — PROCEDURES
Referring Denae Acosta MD  Date: 2/14/2023  Start Time:2/13/2023 @ 1524  End Time: 2/14/2023 @ [phone]    Indication  Patient with possible seizures. Introduction  This continuous video-EEG was acquired using a [employer] workstation at 256 samples/s. Electrodes were placed according to the International 10-20 system. Automated spike and seizure detection algorithms were applied. Video was recorded during this study. Description  During the maximal alert state, a well-regulated 9 Hz posterior dominant rhythm was seen which was symmetrical and attenuated to eye opening. No consistent focal slowing or interhemispheric asymmetry was noted. Normal sleep structures were observed. There were no interictal epileptiform discharges or electrographic seizures. Events  No events reported. Impression  Normal continuous video-EEG. EKG lead did not show clear arrhythmia, if still in concern consider formal EKG or correlation with telemetry. No epileptiform discharges were identified. Please note the absence of   such activity in this record cannot conclusively rule out an epileptic   disorder. If such is still clinically suspected, a repeat study with   prolonged sampling may be helpful. Lora Butler MD  Epilepsy Board Certified. Neurology Board Certified.     Electronically Signed

## 2023-02-14 NOTE — PLAN OF CARE
Problem: Discharge Planning  Goal: Discharge to home or other facility with appropriate resources  2/14/2023 0910 by Coleman Ross RN  Outcome: Progressing     Problem: Pain  Goal: Verbalizes/displays adequate comfort level or baseline comfort level  2/14/2023 0910 by Coleman Ross RN  Outcome: Progressing     Problem: Safety - Adult  Goal: Free from fall injury  2/14/2023 0910 by Coleman Ross RN  Outcome: Progressing

## 2023-02-17 LAB
EKG ATRIAL RATE: 62 BPM
EKG P AXIS: 65 DEGREES
EKG P-R INTERVAL: 164 MS
EKG Q-T INTERVAL: 396 MS
EKG QRS DURATION: 86 MS
EKG QTC CALCULATION (BAZETT): 401 MS
EKG R AXIS: 70 DEGREES
EKG T AXIS: 53 DEGREES
EKG VENTRICULAR RATE: 62 BPM

## 2024-01-02 NOTE — DISCHARGE INSTRUCTIONS
For leg contusion. At this time you are stable for discharge. Please follow-up with primary care within the next 2 days. Please return to ED if any worsening of condition. Please continue to take ibuprofen, drink plenty of fluids to help with pain inflammation. 3

## 2025-07-21 ENCOUNTER — HOSPITAL ENCOUNTER (EMERGENCY)
Age: 25
Discharge: HOME | End: 2025-07-21
Attending: EMERGENCY MEDICINE

## 2025-07-21 VITALS
RESPIRATION RATE: 17 BRPM | WEIGHT: 170 LBS | DIASTOLIC BLOOD PRESSURE: 63 MMHG | TEMPERATURE: 98.2 F | BODY MASS INDEX: 26.68 KG/M2 | HEIGHT: 67 IN | SYSTOLIC BLOOD PRESSURE: 116 MMHG | HEART RATE: 65 BPM | OXYGEN SATURATION: 100 %
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
## 2025-07-21 DIAGNOSIS — S61.452A DOG BITE OF LEFT HAND, INITIAL ENCOUNTER: Primary | ICD-10-CM

## 2025-07-21 DIAGNOSIS — S61.412A LACERATION OF LEFT HAND WITHOUT FOREIGN BODY, INITIAL ENCOUNTER: ICD-10-CM

## 2025-07-21 PROCEDURE — 6360000002 HC RX W HCPCS: Performed by: EMERGENCY MEDICINE

## 2025-07-21 PROCEDURE — 90715 TDAP VACCINE 7 YRS/> IM: CPT | Performed by: EMERGENCY MEDICINE

## 2025-07-21 PROCEDURE — 12002 RPR S/N/AX/GEN/TRNK2.6-7.5CM: CPT

## 2025-07-21 PROCEDURE — 6370000000 HC RX 637 (ALT 250 FOR IP): Performed by: EMERGENCY MEDICINE

## 2025-07-21 PROCEDURE — 99284 EMERGENCY DEPT VISIT MOD MDM: CPT

## 2025-07-21 PROCEDURE — 90471 IMMUNIZATION ADMIN: CPT | Performed by: EMERGENCY MEDICINE

## 2025-07-21 RX ORDER — LIDOCAINE HYDROCHLORIDE AND EPINEPHRINE 10; 10 MG/ML; UG/ML
20 INJECTION, SOLUTION INFILTRATION; PERINEURAL ONCE
Status: COMPLETED | OUTPATIENT
Start: 2025-07-21 | End: 2025-07-21

## 2025-07-21 RX ORDER — IBUPROFEN 200 MG
400 TABLET ORAL ONCE
Status: COMPLETED | OUTPATIENT
Start: 2025-07-21 | End: 2025-07-21

## 2025-07-21 RX ORDER — AMOXICILLIN AND CLAVULANATE POTASSIUM 500; 125 MG/1; MG/1
1 TABLET, FILM COATED ORAL 3 TIMES DAILY
Qty: 30 TABLET | Refills: 0 | Status: SHIPPED | OUTPATIENT
Start: 2025-07-21 | End: 2025-07-31

## 2025-07-21 RX ORDER — IBUPROFEN 400 MG/1
400 TABLET, FILM COATED ORAL EVERY 6 HOURS PRN
Qty: 20 TABLET | Refills: 0 | Status: SHIPPED | OUTPATIENT
Start: 2025-07-21

## 2025-07-21 RX ADMIN — LIDOCAINE HYDROCHLORIDE,EPINEPHRINE BITARTRATE 20 ML: 10; .01 INJECTION, SOLUTION INFILTRATION; PERINEURAL at 05:43

## 2025-07-21 RX ADMIN — TETANUS TOXOID, REDUCED DIPHTHERIA TOXOID AND ACELLULAR PERTUSSIS VACCINE, ADSORBED 0.5 ML: 5; 2.5; 8; 8; 2.5 SUSPENSION INTRAMUSCULAR at 06:16

## 2025-07-21 RX ADMIN — IBUPROFEN 400 MG: 200 TABLET, FILM COATED ORAL at 05:41

## 2025-07-21 RX ADMIN — AMOXICILLIN AND CLAVULANATE POTASSIUM 1 TABLET: 875; 125 TABLET, FILM COATED ORAL at 05:40

## 2025-07-21 ASSESSMENT — PAIN DESCRIPTION - ORIENTATION: ORIENTATION: LEFT

## 2025-07-21 ASSESSMENT — PAIN - FUNCTIONAL ASSESSMENT: PAIN_FUNCTIONAL_ASSESSMENT: 0-10

## 2025-07-21 ASSESSMENT — PAIN SCALES - GENERAL: PAINLEVEL_OUTOF10: 10

## 2025-07-21 ASSESSMENT — PAIN DESCRIPTION - LOCATION: LOCATION: HAND

## 2025-07-21 NOTE — ED TRIAGE NOTES
Pt to ED via private vehicle w/ report of dog bite to left hand. Pt reports that about 20 minutes ago her dogs got in a fight and one of the dogs bit her hand. Pt presents w/ laceration to left hand between thumb and index finger, two bite marks to dorsal hand, and bite to ring finger and middle finger. Bleeding controlled at this time. Ring removed from ring finger at this time. A&O X 4. Pt breathing even and unlabored.

## 2025-07-21 NOTE — ED PROVIDER NOTES
[facility] EMERGENCY DEPARTMENT  EMERGENCY DEPARTMENT ENCOUNTER          Pt Name: Kinsey Serrano  MRN: [id number]  Birthdate 2000  Date of evaluation: 7/21/2025  Physician: Anuj Bansal MD, FACEP, FAAEM, FAWM      CHIEF COMPLAINT       Chief Complaint   Patient presents with    Animal Bite     Left hand         HISTORY OF PRESENT ILLNESS    HPI  Kinsey Serrano is a 25 y.o. female who presents to the emergency department from home, as a walk in to the ED lobby for evaluation of dog bite.  Patient states that both of her dogs were fighting and when she tried to separate them the little 1 grabbed her hand and when she tried to pull away her dog's mouth got tighter and broke her skin.  Patient washed with soap and water and immediately came to the emergency department.  The patient has no other acute complaints at this time.      PAST MEDICAL AND SURGICAL HISTORY     Past Medical History:   Diagnosis Date    Depression     Seizures (HCC)     as a small child     Past Surgical History:   Procedure Laterality Date    CYST REMOVAL      WISDOM TOOTH EXTRACTION           MEDICATIONS     Current Facility-Administered Medications:     amoxicillin-clavulanate (AUGMENTIN) 875-125 MG per tablet 1 tablet, 1 tablet, Oral, 2 times per day, Anuj Bansal MD, 1 tablet at 07/21/25 0540    Tetanus-Diphth-Acell Pertussis (BOOSTRIX) injection 0.5 mL, 0.5 mL, IntraMUSCular, Once, Anuj Bansal MD    Current Outpatient Medications:     amoxicillin-clavulanate (AUGMENTIN) 500-125 MG per tablet, Take 1 tablet by mouth 3 times daily for 10 days, Disp: 30 tablet, Rfl: 0    ibuprofen (IBU) 400 MG tablet, Take 1 tablet by mouth every 6 hours as needed for Pain, Disp: 20 tablet, Rfl: 0    cyclobenzaprine (FLEXERIL) 5 MG tablet, Take 1 tablet by mouth 3 times daily as needed for Muscle spasms (Patient not taking: Reported on 10/21/2022), Disp: 12 tablet, Rfl: 0    Previous Medications    CYCLOBENZAPRINE (FLEXERIL) 5 MG

## 2025-07-21 NOTE — ED NOTES
Dr. Bansal at bedside preparing for suturing. Pt medicated per MAR. Vitals collected. Pt breathing even and unlabored. Pt resting on cot speaking on phone. Call light in reach.  
no

## 2025-07-28 ENCOUNTER — HOSPITAL ENCOUNTER (EMERGENCY)
Age: 25
Discharge: HOME | End: 2025-07-28

## 2025-07-28 VITALS
HEART RATE: 96 BPM | SYSTOLIC BLOOD PRESSURE: 120 MMHG | BODY MASS INDEX: 26.63 KG/M2 | TEMPERATURE: 98.2 F | WEIGHT: 170 LBS | DIASTOLIC BLOOD PRESSURE: 77 MMHG | RESPIRATION RATE: 18 BRPM | OXYGEN SATURATION: 98 %

## 2025-07-28 DIAGNOSIS — Z48.02 ENCOUNTER FOR REMOVAL OF SUTURES: Primary | ICD-10-CM

## 2025-07-28 PROCEDURE — 99024 POSTOP FOLLOW-UP VISIT: CPT

## 2025-07-28 PROCEDURE — 99211 OFF/OP EST MAY X REQ PHY/QHP: CPT

## 2025-07-28 ASSESSMENT — PAIN - FUNCTIONAL ASSESSMENT: PAIN_FUNCTIONAL_ASSESSMENT: 0-10

## 2025-07-28 ASSESSMENT — PAIN DESCRIPTION - PAIN TYPE: TYPE: ACUTE PAIN

## 2025-07-28 ASSESSMENT — PAIN DESCRIPTION - ORIENTATION: ORIENTATION: LEFT

## 2025-07-28 ASSESSMENT — PAIN SCALES - GENERAL: PAINLEVEL_OUTOF10: 5

## 2025-07-28 ASSESSMENT — PAIN DESCRIPTION - LOCATION: LOCATION: HAND

## 2025-07-28 NOTE — ED TRIAGE NOTES
Pt ambulatory to [facility] with c/o needing stitches removed from L hand. Pt had stitches placed on 7/21/25 after sustaining a dog bite. Pt reports taking antibiotics as prescribed. Reports 5/10 pain in L hand. Pt reports having blood draining from suture site \"a couple days ago\". No other concerns noted.

## 2025-07-28 NOTE — ED PROVIDER NOTES
[facility] URGENT CARE  Urgent Care Encounter       CHIEF COMPLAINT       Chief Complaint   Patient presents with    Suture / Staple Removal       Nurses Notes reviewed and I agree except as noted in the HPI.  HISTORY OF PRESENT ILLNESS   Kinsey Serrano is a 25 y.o. female who presents for suture removal. Patient had three sutures placed in the left hand seven days ago at [facility] ER. Patient reports has been cleaning with soap and water as well as putting OTC Neosporin on site. Patient reports site has been opening up and \"ozzing blood\", reports most recently this morning. Patient expresses concern sutures are not ready to be removed.     HPI    REVIEW OF SYSTEMS     Review of Systems   Skin:  Positive for wound (three sutures placed in left hand).   All other systems reviewed and are negative.      PAST MEDICAL HISTORY         Diagnosis Date    Depression     Seizures (HCC)     as a small child       SURGICALHISTORY     Patient  has a past surgical history that includes cyst removal and [place] tooth extraction.    CURRENT MEDICATIONS       Previous Medications    AMOXICILLIN-CLAVULANATE (AUGMENTIN) 500-125 MG PER TABLET    Take 1 tablet by mouth 3 times daily for 10 days    CYCLOBENZAPRINE (FLEXERIL) 5 MG TABLET    Take 1 tablet by mouth 3 times daily as needed for Muscle spasms    IBUPROFEN (IBU) 400 MG TABLET    Take 1 tablet by mouth every 6 hours as needed for Pain       ALLERGIES     Patient is has no known allergies.    Patients   Immunization History   Administered Date(s) Administered    TDaP, ADACEL (age 10y-64y), BOOSTRIX (age 10y+), IM, 0.5mL 07/21/2025       FAMILY HISTORY     Patient's family history includes Cancer in her mother; Depression in her mother; No Known Problems in her father; Substance Abuse in her mother.    SOCIAL HISTORY     Patient  reports that she has been smoking cigarettes. She has never used smokeless tobacco. She reports current alcohol use. She reports current drug use. Drug:

## 2025-07-28 NOTE — DISCHARGE INSTRUCTIONS
Continue OTC Neosporin   Ice  Tylenol / Ibuprofen as needed for fever and or pain.  Follow up with PCP or return in 3 days for removal.

## 2025-08-05 ENCOUNTER — HOSPITAL ENCOUNTER (EMERGENCY)
Age: 25
Discharge: HOME | End: 2025-08-05

## 2025-08-05 VITALS
HEART RATE: 78 BPM | WEIGHT: 170 LBS | DIASTOLIC BLOOD PRESSURE: 75 MMHG | TEMPERATURE: 98.2 F | SYSTOLIC BLOOD PRESSURE: 124 MMHG | BODY MASS INDEX: 26.63 KG/M2 | OXYGEN SATURATION: 98 % | RESPIRATION RATE: 16 BRPM

## 2025-08-05 DIAGNOSIS — Z48.02 VISIT FOR SUTURE REMOVAL: Primary | ICD-10-CM

## 2025-08-05 PROCEDURE — 99213 OFFICE O/P EST LOW 20 MIN: CPT

## 2025-08-05 PROCEDURE — 99024 POSTOP FOLLOW-UP VISIT: CPT | Performed by: NURSE PRACTITIONER

## 2025-08-05 ASSESSMENT — PAIN - FUNCTIONAL ASSESSMENT: PAIN_FUNCTIONAL_ASSESSMENT: NONE - DENIES PAIN
